# Patient Record
Sex: MALE | Race: WHITE | URBAN - METROPOLITAN AREA
[De-identification: names, ages, dates, MRNs, and addresses within clinical notes are randomized per-mention and may not be internally consistent; named-entity substitution may affect disease eponyms.]

---

## 2021-06-01 ENCOUNTER — RECORDS - HEALTHEAST (OUTPATIENT)
Dept: ADMINISTRATIVE | Facility: CLINIC | Age: 76
End: 2021-06-01

## 2025-05-14 RX ORDER — TRAMADOL HYDROCHLORIDE 50 MG/1
50 TABLET ORAL EVERY 12 HOURS PRN
Status: ON HOLD | COMMUNITY
Start: 2025-04-30 | End: 2025-06-05

## 2025-05-14 RX ORDER — MIRTAZAPINE 15 MG/1
15 TABLET, FILM COATED ORAL AT BEDTIME
Status: ON HOLD | COMMUNITY
Start: 2024-08-02

## 2025-05-14 RX ORDER — BUPROPION HYDROCHLORIDE 150 MG/1
150 TABLET ORAL EVERY MORNING
Status: ON HOLD | COMMUNITY
Start: 2024-12-05

## 2025-05-14 NOTE — PROVIDER NOTIFICATION
05/14/25 1238   Discharge Planning   Patient/Family Anticipates Transition to home with family   Concerns to be Addressed denies needs/concerns at this time   Living Arrangements   People in Home spouse   Type of Residence Private Residence   Is your private residence a single family home or apartment? Single family home   Number of Stairs, Within Home, Primary two   Stair Railings, Within Home, Primary railings safe and in good condition   Once home, are you able to live on one level? Yes   Which level?   (Ranch level home)   Which rooms are not on the main level? Other (Comments)  (Basement)   Bathroom Shower/Tub Tub/Shower unit   Equipment Currently Used at Home other (see comments)  (walking stick)   Support System   Support Systems Spouse/Significant Other   Do you have someone available to stay with you one or two nights once you are home? Yes  (Ryley, Wife)   Medical Clearance   Date of Physical 05/19/25   Clinic Name [facility] in [place]   It is recommended that you call and check with any specialty providers before surgery to see if you need surgical clearance.  Do you see any specialty providers outside of your primary care provider? No   Blood   Known Bleeding Disorder or Coagulopathy No  (on Eliquis)   What are your blood product preferences? patient accepts blood in an emergency   Falls Risk   Has the patient been identified as a high falls risk before surgery? (fallen in the last 6 months, history of falls, unsteady gait, or balance issues) Yes  (unsteady gait)   Did an order get placed to attend outpatient pre-surgery balance evaluation? No   Relationship/Environment   Name(s) of People in Home Melchor, Wife

## 2025-06-03 RX ORDER — ACETAMINOPHEN 500 MG
1000 TABLET ORAL EVERY 6 HOURS PRN
Status: ON HOLD | COMMUNITY

## 2025-06-03 RX ORDER — VENLAFAXINE 25 MG/1
25 TABLET ORAL 2 TIMES DAILY
Status: ON HOLD | COMMUNITY

## 2025-06-03 RX ORDER — VENLAFAXINE 25 MG/1
50 TABLET ORAL EVERY MORNING
Status: ON HOLD | COMMUNITY

## 2025-06-03 RX ORDER — CARBIDOPA AND LEVODOPA 50; 200 MG/1; MG/1
1 TABLET, EXTENDED RELEASE ORAL AT BEDTIME
Status: ON HOLD | COMMUNITY

## 2025-06-03 RX ORDER — LATANOPROST 50 UG/ML
1 SOLUTION/ DROPS OPHTHALMIC AT BEDTIME
Status: ON HOLD | COMMUNITY

## 2025-06-03 RX ORDER — DORZOLAMIDE HYDROCHLORIDE AND TIMOLOL MALEATE 20; 5 MG/ML; MG/ML
1 SOLUTION/ DROPS OPHTHALMIC AT BEDTIME
Status: ON HOLD | COMMUNITY

## 2025-06-03 NOTE — PHARMACY-ADMISSION MEDICATION HISTORY
Pharmacist Admission Medication History    Admission medication history is complete. The information provided in this note is only as accurate as the sources available at the time of the update.    Information Source(s): Patient and CareEverywhere/SureScripts via phone    Pertinent Information: initial med list was completed by presurgircal nurse. Writer called to clarify a few meds. Patient confirmed the Cosopt regimen below.    Changes made to PTA medication list:  Added: Sinemet CR, Tylenol, instruction for multiple meds  Deleted: clonazepam,   Changed: Sinemet dosing; venlafaxine 100 mg BID -> current regimens;     Allergies reviewed with patient and updates made in EHR: unable to assess    Medication History Completed By: Tate Leon RPH 6/3/2025 2:30 PM    PTA Med List   Medication Sig Note Last Dose/Taking    acetaminophen (TYLENOL) 500 MG tablet Take 1,000 mg by mouth every 6 hours as needed for mild pain.  Taking As Needed    apixaban ANTICOAGULANT (ELIQUIS) 2.5 MG tablet Take 2.5 mg by mouth 2 times daily.  Taking    buPROPion (WELLBUTRIN XL) 150 MG 24 hr tablet Take 150 mg by mouth every morning.  Taking    carbidopa-levodopa (SINEMET CR)  MG CR tablet Take 1 tablet by mouth at bedtime.  Taking    carbidopa-levodopa (SINEMET)  MG per tablet Take 2 tablets by mouth 3 times daily.  Taking    cyanocobalamin (VITAMIN B12) 1000 MCG/ML injection Inject 1 mL into the muscle every 30 days 6/3/2025: 1st day of every month Taking    dorzolamide-timolol (COSOPT) 2-0.5 % ophthalmic solution Place 1 drop Into the left eye at bedtime.  Taking    gabapentin (NEURONTIN) 300 MG capsule Take 600 mg by mouth at bedtime.  Taking    latanoprost (XALATAN) 0.005 % ophthalmic solution Place 1 drop into both eyes at bedtime.  Taking    mirtazapine (REMERON) 15 MG tablet Take 15 mg by mouth at bedtime.  Taking    Multiple Vitamin (MULTIVITAMIN PO) Take 1 tablet by mouth daily.  Taking    traMADol (ULTRAM) 50 MG  tablet Take 50 mg by mouth every 12 hours as needed.  Taking As Needed    venlafaxine (EFFEXOR) 25 MG tablet Take 50 mg by mouth every morning.  Taking    venlafaxine (EFFEXOR) 25 MG tablet Take 25 mg by mouth 2 times daily. Noon and bedtime.  Taking

## 2025-06-04 ENCOUNTER — HOSPITAL ENCOUNTER (INPATIENT)
Facility: CLINIC | Age: 80
End: 2025-06-04
Attending: ORTHOPAEDIC SURGERY | Admitting: ORTHOPAEDIC SURGERY
Payer: MEDICARE

## 2025-06-04 ENCOUNTER — APPOINTMENT (OUTPATIENT)
Dept: GENERAL RADIOLOGY | Facility: CLINIC | Age: 80
DRG: 470 | End: 2025-06-04
Attending: ORTHOPAEDIC SURGERY
Payer: MEDICARE

## 2025-06-04 ENCOUNTER — APPOINTMENT (OUTPATIENT)
Dept: PHYSICAL THERAPY | Facility: CLINIC | Age: 80
DRG: 470 | End: 2025-06-04
Attending: ORTHOPAEDIC SURGERY
Payer: MEDICARE

## 2025-06-04 ENCOUNTER — ANESTHESIA (OUTPATIENT)
Dept: SURGERY | Facility: CLINIC | Age: 80
End: 2025-06-04
Payer: MEDICARE

## 2025-06-04 ENCOUNTER — ANESTHESIA EVENT (OUTPATIENT)
Dept: SURGERY | Facility: CLINIC | Age: 80
End: 2025-06-04
Payer: MEDICARE

## 2025-06-04 DIAGNOSIS — Z96.642 STATUS POST TOTAL HIP REPLACEMENT, LEFT: Primary | ICD-10-CM

## 2025-06-04 PROCEDURE — 250N000011 HC RX IP 250 OP 636: Performed by: PHYSICIAN ASSISTANT

## 2025-06-04 PROCEDURE — C1776 JOINT DEVICE (IMPLANTABLE): HCPCS | Performed by: ORTHOPAEDIC SURGERY

## 2025-06-04 PROCEDURE — 258N000001 HC RX 258: Performed by: ORTHOPAEDIC SURGERY

## 2025-06-04 PROCEDURE — 97161 PT EVAL LOW COMPLEX 20 MIN: CPT | Mod: GP | Performed by: PHYSICAL THERAPIST

## 2025-06-04 PROCEDURE — 250N000011 HC RX IP 250 OP 636: Mod: JZ | Performed by: NURSE ANESTHETIST, CERTIFIED REGISTERED

## 2025-06-04 PROCEDURE — 250N000009 HC RX 250: Performed by: NURSE ANESTHETIST, CERTIFIED REGISTERED

## 2025-06-04 PROCEDURE — 97116 GAIT TRAINING THERAPY: CPT | Mod: GP | Performed by: PHYSICAL THERAPIST

## 2025-06-04 PROCEDURE — 250N000011 HC RX IP 250 OP 636: Mod: JZ | Performed by: ANESTHESIOLOGY

## 2025-06-04 PROCEDURE — 999N000141 HC STATISTIC PRE-PROCEDURE NURSING ASSESSMENT: Performed by: ORTHOPAEDIC SURGERY

## 2025-06-04 PROCEDURE — 250N000009 HC RX 250: Performed by: ORTHOPAEDIC SURGERY

## 2025-06-04 PROCEDURE — 258N000003 HC RX IP 258 OP 636: Performed by: NURSE ANESTHETIST, CERTIFIED REGISTERED

## 2025-06-04 PROCEDURE — 250N000025 HC SEVOFLURANE, PER MIN: Performed by: ORTHOPAEDIC SURGERY

## 2025-06-04 PROCEDURE — C1713 ANCHOR/SCREW BN/BN,TIS/BN: HCPCS | Performed by: ORTHOPAEDIC SURGERY

## 2025-06-04 PROCEDURE — 710N000009 HC RECOVERY PHASE 1, LEVEL 1, PER MIN: Performed by: ORTHOPAEDIC SURGERY

## 2025-06-04 PROCEDURE — 370N000017 HC ANESTHESIA TECHNICAL FEE, PER MIN: Performed by: ORTHOPAEDIC SURGERY

## 2025-06-04 PROCEDURE — 250N000011 HC RX IP 250 OP 636: Mod: JZ | Performed by: ORTHOPAEDIC SURGERY

## 2025-06-04 PROCEDURE — 250N000013 HC RX MED GY IP 250 OP 250 PS 637: Performed by: STUDENT IN AN ORGANIZED HEALTH CARE EDUCATION/TRAINING PROGRAM

## 2025-06-04 PROCEDURE — 250N000013 HC RX MED GY IP 250 OP 250 PS 637: Performed by: PHYSICIAN ASSISTANT

## 2025-06-04 PROCEDURE — 0LQK0ZZ REPAIR LEFT HIP TENDON, OPEN APPROACH: ICD-10-PCS | Performed by: ORTHOPAEDIC SURGERY

## 2025-06-04 PROCEDURE — 360N000077 HC SURGERY LEVEL 4, PER MIN: Performed by: ORTHOPAEDIC SURGERY

## 2025-06-04 PROCEDURE — 272N000001 HC OR GENERAL SUPPLY STERILE: Performed by: ORTHOPAEDIC SURGERY

## 2025-06-04 PROCEDURE — 120N000001 HC R&B MED SURG/OB

## 2025-06-04 PROCEDURE — 999N000063 XR PELVIS PORT 1/2 VIEWS

## 2025-06-04 PROCEDURE — 97530 THERAPEUTIC ACTIVITIES: CPT | Mod: GP | Performed by: PHYSICAL THERAPIST

## 2025-06-04 PROCEDURE — 258N000003 HC RX IP 258 OP 636: Performed by: ORTHOPAEDIC SURGERY

## 2025-06-04 PROCEDURE — 250N000013 HC RX MED GY IP 250 OP 250 PS 637: Performed by: ANESTHESIOLOGY

## 2025-06-04 PROCEDURE — 0SRB04A REPLACEMENT OF LEFT HIP JOINT WITH CERAMIC ON POLYETHYLENE SYNTHETIC SUBSTITUTE, UNCEMENTED, OPEN APPROACH: ICD-10-PCS | Performed by: ORTHOPAEDIC SURGERY

## 2025-06-04 DEVICE — BIOLOX DELTA CERAMIC FEMORAL HEAD +1.5 36MM DIA 12/14 TAPER
Type: IMPLANTABLE DEVICE | Site: HIP | Status: FUNCTIONAL
Brand: BIOLOX DELTA

## 2025-06-04 DEVICE — PINNACLE HIP SOLUTIONS ALTRX POLYETHYLENE ACETABULAR LINER +4 NEUTRAL 36MM ID 58MM OD
Type: IMPLANTABLE DEVICE | Site: HIP | Status: FUNCTIONAL
Brand: PINNACLE ALTRX

## 2025-06-04 DEVICE — PINNACLE POROCOAT ACETABULAR SHELL SECTOR II 58MM OD
Type: IMPLANTABLE DEVICE | Site: HIP | Status: FUNCTIONAL
Brand: PINNACLE POROCOAT

## 2025-06-04 DEVICE — PINNACLE CANCELLOUS BONE SCREW 6.5MM X 25MM
Type: IMPLANTABLE DEVICE | Site: HIP | Status: FUNCTIONAL
Brand: PINNACLE

## 2025-06-04 DEVICE — ACTIS DUOFIX HIP PROSTHESIS (FEMORAL STEM 12/14 TAPER CEMENTLESS SIZE 8 STD COLLAR)  CE
Type: IMPLANTABLE DEVICE | Site: HIP | Status: FUNCTIONAL
Brand: ACTIS

## 2025-06-04 DEVICE — IMPLANTABLE DEVICE: Type: IMPLANTABLE DEVICE | Site: HIP | Status: FUNCTIONAL

## 2025-06-04 RX ORDER — SODIUM CHLORIDE, SODIUM LACTATE, POTASSIUM CHLORIDE, CALCIUM CHLORIDE 600; 310; 30; 20 MG/100ML; MG/100ML; MG/100ML; MG/100ML
INJECTION, SOLUTION INTRAVENOUS CONTINUOUS
Status: DISCONTINUED | OUTPATIENT
Start: 2025-06-04 | End: 2025-06-04 | Stop reason: HOSPADM

## 2025-06-04 RX ORDER — SODIUM CHLORIDE, SODIUM LACTATE, POTASSIUM CHLORIDE, CALCIUM CHLORIDE 600; 310; 30; 20 MG/100ML; MG/100ML; MG/100ML; MG/100ML
INJECTION, SOLUTION INTRAVENOUS CONTINUOUS PRN
Status: DISCONTINUED | OUTPATIENT
Start: 2025-06-04 | End: 2025-06-04

## 2025-06-04 RX ORDER — VENLAFAXINE 25 MG/1
25 TABLET ORAL 2 TIMES DAILY
Status: DISPENSED | OUTPATIENT
Start: 2025-06-04

## 2025-06-04 RX ORDER — TRANEXAMIC ACID 650 MG/1
1950 TABLET ORAL ONCE
Status: COMPLETED | OUTPATIENT
Start: 2025-06-04 | End: 2025-06-04

## 2025-06-04 RX ORDER — NALOXONE HYDROCHLORIDE 0.4 MG/ML
0.4 INJECTION, SOLUTION INTRAMUSCULAR; INTRAVENOUS; SUBCUTANEOUS
Status: ACTIVE | OUTPATIENT
Start: 2025-06-04

## 2025-06-04 RX ORDER — KETOROLAC TROMETHAMINE 15 MG/ML
15 INJECTION, SOLUTION INTRAMUSCULAR; INTRAVENOUS
Status: DISCONTINUED | OUTPATIENT
Start: 2025-06-04 | End: 2025-06-04 | Stop reason: HOSPADM

## 2025-06-04 RX ORDER — LIDOCAINE 40 MG/G
CREAM TOPICAL
Status: DISCONTINUED | OUTPATIENT
Start: 2025-06-04 | End: 2025-06-04 | Stop reason: HOSPADM

## 2025-06-04 RX ORDER — FENTANYL CITRATE 50 UG/ML
25 INJECTION, SOLUTION INTRAMUSCULAR; INTRAVENOUS EVERY 5 MIN PRN
Status: DISCONTINUED | OUTPATIENT
Start: 2025-06-04 | End: 2025-06-04 | Stop reason: HOSPADM

## 2025-06-04 RX ORDER — MIRTAZAPINE 15 MG/1
15 TABLET, FILM COATED ORAL AT BEDTIME
Status: DISPENSED | OUTPATIENT
Start: 2025-06-04

## 2025-06-04 RX ORDER — NALOXONE HYDROCHLORIDE 0.4 MG/ML
0.1 INJECTION, SOLUTION INTRAMUSCULAR; INTRAVENOUS; SUBCUTANEOUS
Status: DISCONTINUED | OUTPATIENT
Start: 2025-06-04 | End: 2025-06-04 | Stop reason: HOSPADM

## 2025-06-04 RX ORDER — CYANOCOBALAMIN 1000 UG/ML
1000 INJECTION, SOLUTION INTRAMUSCULAR; SUBCUTANEOUS
Status: ACTIVE | OUTPATIENT
Start: 2025-06-25

## 2025-06-04 RX ORDER — METOPROLOL TARTRATE 1 MG/ML
1-2 INJECTION, SOLUTION INTRAVENOUS EVERY 5 MIN PRN
Status: DISCONTINUED | OUTPATIENT
Start: 2025-06-04 | End: 2025-06-04 | Stop reason: HOSPADM

## 2025-06-04 RX ORDER — ONDANSETRON 2 MG/ML
4 INJECTION INTRAMUSCULAR; INTRAVENOUS EVERY 30 MIN PRN
Status: DISCONTINUED | OUTPATIENT
Start: 2025-06-04 | End: 2025-06-04 | Stop reason: HOSPADM

## 2025-06-04 RX ORDER — HYDROXYZINE HYDROCHLORIDE 10 MG/1
10 TABLET, FILM COATED ORAL EVERY 6 HOURS PRN
Status: DISPENSED | OUTPATIENT
Start: 2025-06-04

## 2025-06-04 RX ORDER — DORZOLAMIDE HYDROCHLORIDE AND TIMOLOL MALEATE 20; 5 MG/ML; MG/ML
1 SOLUTION/ DROPS OPHTHALMIC AT BEDTIME
Status: DISPENSED | OUTPATIENT
Start: 2025-06-04

## 2025-06-04 RX ORDER — DEXAMETHASONE SODIUM PHOSPHATE 4 MG/ML
INJECTION, SOLUTION INTRA-ARTICULAR; INTRALESIONAL; INTRAMUSCULAR; INTRAVENOUS; SOFT TISSUE PRN
Status: DISCONTINUED | OUTPATIENT
Start: 2025-06-04 | End: 2025-06-04

## 2025-06-04 RX ORDER — DEXAMETHASONE SODIUM PHOSPHATE 4 MG/ML
4 INJECTION, SOLUTION INTRA-ARTICULAR; INTRALESIONAL; INTRAMUSCULAR; INTRAVENOUS; SOFT TISSUE
Status: DISCONTINUED | OUTPATIENT
Start: 2025-06-04 | End: 2025-06-04 | Stop reason: HOSPADM

## 2025-06-04 RX ORDER — CEFAZOLIN SODIUM 1 G/3ML
1 INJECTION, POWDER, FOR SOLUTION INTRAMUSCULAR; INTRAVENOUS EVERY 8 HOURS
Status: COMPLETED | OUTPATIENT
Start: 2025-06-04 | End: 2025-06-04

## 2025-06-04 RX ORDER — HYDROMORPHONE HCL IN WATER/PF 6 MG/30 ML
0.2 PATIENT CONTROLLED ANALGESIA SYRINGE INTRAVENOUS EVERY 5 MIN PRN
Status: DISCONTINUED | OUTPATIENT
Start: 2025-06-04 | End: 2025-06-04 | Stop reason: HOSPADM

## 2025-06-04 RX ORDER — EPHEDRINE SULFATE 50 MG/ML
INJECTION, SOLUTION INTRAMUSCULAR; INTRAVENOUS; SUBCUTANEOUS PRN
Status: DISCONTINUED | OUTPATIENT
Start: 2025-06-04 | End: 2025-06-04

## 2025-06-04 RX ORDER — CARBIDOPA AND LEVODOPA 50; 200 MG/1; MG/1
1 TABLET, EXTENDED RELEASE ORAL AT BEDTIME
Status: DISPENSED | OUTPATIENT
Start: 2025-06-05

## 2025-06-04 RX ORDER — LATANOPROST 50 UG/ML
1 SOLUTION/ DROPS OPHTHALMIC AT BEDTIME
Status: DISPENSED | OUTPATIENT
Start: 2025-06-04

## 2025-06-04 RX ORDER — HYDROMORPHONE HCL IN WATER/PF 6 MG/30 ML
0.4 PATIENT CONTROLLED ANALGESIA SYRINGE INTRAVENOUS EVERY 5 MIN PRN
Status: DISCONTINUED | OUTPATIENT
Start: 2025-06-04 | End: 2025-06-04 | Stop reason: HOSPADM

## 2025-06-04 RX ORDER — ACETAMINOPHEN 325 MG/1
975 TABLET ORAL ONCE
Status: COMPLETED | OUTPATIENT
Start: 2025-06-04 | End: 2025-06-04

## 2025-06-04 RX ORDER — ONDANSETRON 2 MG/ML
INJECTION INTRAMUSCULAR; INTRAVENOUS PRN
Status: DISCONTINUED | OUTPATIENT
Start: 2025-06-04 | End: 2025-06-04

## 2025-06-04 RX ORDER — FENTANYL CITRATE 50 UG/ML
50 INJECTION, SOLUTION INTRAMUSCULAR; INTRAVENOUS EVERY 5 MIN PRN
Status: DISCONTINUED | OUTPATIENT
Start: 2025-06-04 | End: 2025-06-04 | Stop reason: HOSPADM

## 2025-06-04 RX ORDER — ACETAMINOPHEN 325 MG/1
975 TABLET ORAL EVERY 8 HOURS SCHEDULED
Status: DISPENSED | OUTPATIENT
Start: 2025-06-04

## 2025-06-04 RX ORDER — CEFAZOLIN SODIUM/WATER 2 G/20 ML
2 SYRINGE (ML) INTRAVENOUS SEE ADMIN INSTRUCTIONS
Status: DISCONTINUED | OUTPATIENT
Start: 2025-06-04 | End: 2025-06-04 | Stop reason: HOSPADM

## 2025-06-04 RX ORDER — LIDOCAINE 40 MG/G
CREAM TOPICAL
Status: ACTIVE | OUTPATIENT
Start: 2025-06-04

## 2025-06-04 RX ORDER — AMOXICILLIN 250 MG
1 CAPSULE ORAL 2 TIMES DAILY
Status: DISPENSED | OUTPATIENT
Start: 2025-06-04

## 2025-06-04 RX ORDER — POLYETHYLENE GLYCOL 3350 17 G/17G
17 POWDER, FOR SOLUTION ORAL DAILY
Status: ACTIVE | OUTPATIENT
Start: 2025-06-05

## 2025-06-04 RX ORDER — PROPOFOL 10 MG/ML
INJECTION, EMULSION INTRAVENOUS PRN
Status: DISCONTINUED | OUTPATIENT
Start: 2025-06-04 | End: 2025-06-04

## 2025-06-04 RX ORDER — HYDRALAZINE HYDROCHLORIDE 20 MG/ML
2.5-5 INJECTION INTRAMUSCULAR; INTRAVENOUS EVERY 10 MIN PRN
Status: DISCONTINUED | OUTPATIENT
Start: 2025-06-04 | End: 2025-06-04 | Stop reason: HOSPADM

## 2025-06-04 RX ORDER — ONDANSETRON 2 MG/ML
4 INJECTION INTRAMUSCULAR; INTRAVENOUS EVERY 6 HOURS PRN
Status: ACTIVE | OUTPATIENT
Start: 2025-06-04

## 2025-06-04 RX ORDER — VENLAFAXINE 25 MG/1
50 TABLET ORAL EVERY MORNING
Status: DISPENSED | OUTPATIENT
Start: 2025-06-05

## 2025-06-04 RX ORDER — ONDANSETRON 4 MG/1
4 TABLET, ORALLY DISINTEGRATING ORAL EVERY 30 MIN PRN
Status: DISCONTINUED | OUTPATIENT
Start: 2025-06-04 | End: 2025-06-04 | Stop reason: HOSPADM

## 2025-06-04 RX ORDER — FENTANYL CITRATE 50 UG/ML
INJECTION, SOLUTION INTRAMUSCULAR; INTRAVENOUS PRN
Status: DISCONTINUED | OUTPATIENT
Start: 2025-06-04 | End: 2025-06-04

## 2025-06-04 RX ORDER — GABAPENTIN 300 MG/1
600 CAPSULE ORAL AT BEDTIME
Status: DISPENSED | OUTPATIENT
Start: 2025-06-04

## 2025-06-04 RX ORDER — MULTIPLE VITAMINS W/ MINERALS TAB 9MG-400MCG
1 TAB ORAL DAILY
Status: DISPENSED | OUTPATIENT
Start: 2025-06-05

## 2025-06-04 RX ORDER — BUPROPION HYDROCHLORIDE 150 MG/1
150 TABLET ORAL EVERY MORNING
Status: DISPENSED | OUTPATIENT
Start: 2025-06-05

## 2025-06-04 RX ORDER — NALOXONE HYDROCHLORIDE 0.4 MG/ML
0.2 INJECTION, SOLUTION INTRAMUSCULAR; INTRAVENOUS; SUBCUTANEOUS
Status: ACTIVE | OUTPATIENT
Start: 2025-06-04

## 2025-06-04 RX ORDER — PROCHLORPERAZINE MALEATE 5 MG/1
5 TABLET ORAL EVERY 6 HOURS PRN
Status: ACTIVE | OUTPATIENT
Start: 2025-06-04

## 2025-06-04 RX ORDER — LIDOCAINE HYDROCHLORIDE 20 MG/ML
INJECTION, SOLUTION INFILTRATION; PERINEURAL PRN
Status: DISCONTINUED | OUTPATIENT
Start: 2025-06-04 | End: 2025-06-04

## 2025-06-04 RX ORDER — GLYCOPYRROLATE 0.2 MG/ML
INJECTION, SOLUTION INTRAMUSCULAR; INTRAVENOUS PRN
Status: DISCONTINUED | OUTPATIENT
Start: 2025-06-04 | End: 2025-06-04

## 2025-06-04 RX ORDER — OXYCODONE HYDROCHLORIDE 5 MG/1
5 TABLET ORAL EVERY 4 HOURS PRN
Status: DISPENSED | OUTPATIENT
Start: 2025-06-04

## 2025-06-04 RX ORDER — SODIUM CHLORIDE, SODIUM LACTATE, POTASSIUM CHLORIDE, CALCIUM CHLORIDE 600; 310; 30; 20 MG/100ML; MG/100ML; MG/100ML; MG/100ML
INJECTION, SOLUTION INTRAVENOUS CONTINUOUS
Status: ACTIVE | OUTPATIENT
Start: 2025-06-04

## 2025-06-04 RX ORDER — HYDROMORPHONE HCL IN WATER/PF 6 MG/30 ML
0.1 PATIENT CONTROLLED ANALGESIA SYRINGE INTRAVENOUS EVERY 4 HOURS PRN
Status: ACTIVE | OUTPATIENT
Start: 2025-06-04

## 2025-06-04 RX ORDER — BISACODYL 10 MG
10 SUPPOSITORY, RECTAL RECTAL DAILY PRN
Status: ACTIVE | OUTPATIENT
Start: 2025-06-07

## 2025-06-04 RX ORDER — VANCOMYCIN HYDROCHLORIDE 1 G/20ML
INJECTION, POWDER, LYOPHILIZED, FOR SOLUTION INTRAVENOUS PRN
Status: DISCONTINUED | OUTPATIENT
Start: 2025-06-04 | End: 2025-06-04 | Stop reason: HOSPADM

## 2025-06-04 RX ORDER — HYDROMORPHONE HCL IN WATER/PF 6 MG/30 ML
0.2 PATIENT CONTROLLED ANALGESIA SYRINGE INTRAVENOUS EVERY 4 HOURS PRN
Status: ACTIVE | OUTPATIENT
Start: 2025-06-04

## 2025-06-04 RX ORDER — CARBIDOPA AND LEVODOPA 25; 100 MG/1; MG/1
2 TABLET ORAL 3 TIMES DAILY
Status: DISPENSED | OUTPATIENT
Start: 2025-06-04

## 2025-06-04 RX ORDER — ONDANSETRON 4 MG/1
4 TABLET, ORALLY DISINTEGRATING ORAL EVERY 6 HOURS PRN
Status: ACTIVE | OUTPATIENT
Start: 2025-06-04

## 2025-06-04 RX ORDER — CEFAZOLIN SODIUM/WATER 2 G/20 ML
2 SYRINGE (ML) INTRAVENOUS
Status: COMPLETED | OUTPATIENT
Start: 2025-06-04 | End: 2025-06-04

## 2025-06-04 RX ADMIN — FENTANYL CITRATE 50 MCG: 50 INJECTION, SOLUTION INTRAMUSCULAR; INTRAVENOUS at 10:29

## 2025-06-04 RX ADMIN — HYDROXYZINE HYDROCHLORIDE 10 MG: 10 TABLET, FILM COATED ORAL at 11:15

## 2025-06-04 RX ADMIN — PHENYLEPHRINE HYDROCHLORIDE 100 MCG: 10 INJECTION INTRAVENOUS at 08:20

## 2025-06-04 RX ADMIN — HYDROMORPHONE HYDROCHLORIDE 0.4 MG: 0.2 INJECTION, SOLUTION INTRAMUSCULAR; INTRAVENOUS; SUBCUTANEOUS at 10:38

## 2025-06-04 RX ADMIN — LATANOPROST 1 DROP: 50 SOLUTION OPHTHALMIC at 21:51

## 2025-06-04 RX ADMIN — LIDOCAINE HYDROCHLORIDE 50 MG: 20 INJECTION, SOLUTION INFILTRATION; PERINEURAL at 07:45

## 2025-06-04 RX ADMIN — TRANEXAMIC ACID 1950 MG: 650 TABLET ORAL at 05:58

## 2025-06-04 RX ADMIN — FENTANYL CITRATE 50 MCG: 50 INJECTION, SOLUTION INTRAMUSCULAR; INTRAVENOUS at 10:22

## 2025-06-04 RX ADMIN — OXYCODONE HYDROCHLORIDE 5 MG: 5 TABLET ORAL at 11:26

## 2025-06-04 RX ADMIN — PHENYLEPHRINE HYDROCHLORIDE 0.3 MCG/KG/MIN: 10 INJECTION INTRAVENOUS at 08:49

## 2025-06-04 RX ADMIN — HYDROMORPHONE HYDROCHLORIDE 0.5 MG: 1 INJECTION, SOLUTION INTRAMUSCULAR; INTRAVENOUS; SUBCUTANEOUS at 08:08

## 2025-06-04 RX ADMIN — GABAPENTIN 600 MG: 300 CAPSULE ORAL at 21:30

## 2025-06-04 RX ADMIN — FENTANYL CITRATE 100 MCG: 50 INJECTION INTRAMUSCULAR; INTRAVENOUS at 07:45

## 2025-06-04 RX ADMIN — ROCURONIUM BROMIDE 50 MG: 50 INJECTION, SOLUTION INTRAVENOUS at 07:45

## 2025-06-04 RX ADMIN — HYDROMORPHONE HYDROCHLORIDE 0.5 MG: 1 INJECTION, SOLUTION INTRAMUSCULAR; INTRAVENOUS; SUBCUTANEOUS at 08:31

## 2025-06-04 RX ADMIN — ACETAMINOPHEN 975 MG: 325 TABLET, FILM COATED ORAL at 11:04

## 2025-06-04 RX ADMIN — SODIUM CHLORIDE, SODIUM LACTATE, POTASSIUM CHLORIDE, AND CALCIUM CHLORIDE: .6; .31; .03; .02 INJECTION, SOLUTION INTRAVENOUS at 07:33

## 2025-06-04 RX ADMIN — OXYCODONE HYDROCHLORIDE 5 MG: 5 TABLET ORAL at 21:30

## 2025-06-04 RX ADMIN — DEXAMETHASONE SODIUM PHOSPHATE 4 MG: 4 INJECTION, SOLUTION INTRA-ARTICULAR; INTRALESIONAL; INTRAMUSCULAR; INTRAVENOUS; SOFT TISSUE at 07:56

## 2025-06-04 RX ADMIN — PHENYLEPHRINE HYDROCHLORIDE 100 MCG: 10 INJECTION INTRAVENOUS at 08:12

## 2025-06-04 RX ADMIN — CEFAZOLIN 1 G: 1 INJECTION, POWDER, FOR SOLUTION INTRAMUSCULAR; INTRAVENOUS at 16:04

## 2025-06-04 RX ADMIN — PHENYLEPHRINE HYDROCHLORIDE 100 MCG: 10 INJECTION INTRAVENOUS at 08:00

## 2025-06-04 RX ADMIN — Medication 2 G: at 07:33

## 2025-06-04 RX ADMIN — HYDROMORPHONE HYDROCHLORIDE 0.4 MG: 0.2 INJECTION, SOLUTION INTRAMUSCULAR; INTRAVENOUS; SUBCUTANEOUS at 10:49

## 2025-06-04 RX ADMIN — PHENYLEPHRINE HYDROCHLORIDE 100 MCG: 10 INJECTION INTRAVENOUS at 09:38

## 2025-06-04 RX ADMIN — VENLAFAXINE 25 MG: 25 TABLET ORAL at 19:54

## 2025-06-04 RX ADMIN — CARBIDOPA AND LEVODOPA 2 TABLET: 25; 100 TABLET ORAL at 19:54

## 2025-06-04 RX ADMIN — SENNOSIDES AND DOCUSATE SODIUM 1 TABLET: 50; 8.6 TABLET ORAL at 19:54

## 2025-06-04 RX ADMIN — HYDROXYZINE HYDROCHLORIDE 10 MG: 10 TABLET, FILM COATED ORAL at 17:23

## 2025-06-04 RX ADMIN — HYDROMORPHONE HYDROCHLORIDE 0.2 MG: 0.2 INJECTION, SOLUTION INTRAMUSCULAR; INTRAVENOUS; SUBCUTANEOUS at 11:25

## 2025-06-04 RX ADMIN — PHENYLEPHRINE HYDROCHLORIDE 50 MCG: 10 INJECTION INTRAVENOUS at 07:56

## 2025-06-04 RX ADMIN — PHENYLEPHRINE HYDROCHLORIDE 100 MCG: 10 INJECTION INTRAVENOUS at 08:37

## 2025-06-04 RX ADMIN — PHENYLEPHRINE HYDROCHLORIDE 100 MCG: 10 INJECTION INTRAVENOUS at 08:50

## 2025-06-04 RX ADMIN — CEFAZOLIN 1 G: 1 INJECTION, POWDER, FOR SOLUTION INTRAMUSCULAR; INTRAVENOUS at 23:28

## 2025-06-04 RX ADMIN — PHENYLEPHRINE HYDROCHLORIDE 50 MCG: 10 INJECTION INTRAVENOUS at 08:59

## 2025-06-04 RX ADMIN — GLYCOPYRROLATE 0.1 MG: 0.2 INJECTION, SOLUTION INTRAMUSCULAR; INTRAVENOUS at 07:56

## 2025-06-04 RX ADMIN — PROPOFOL 150 MG: 10 INJECTION, EMULSION INTRAVENOUS at 07:45

## 2025-06-04 RX ADMIN — SUGAMMADEX 200 MG: 100 INJECTION, SOLUTION INTRAVENOUS at 10:01

## 2025-06-04 RX ADMIN — EPHEDRINE SULFATE 5 MG: 5 INJECTION INTRAVENOUS at 08:03

## 2025-06-04 RX ADMIN — SODIUM CHLORIDE, SODIUM LACTATE, POTASSIUM CHLORIDE, AND CALCIUM CHLORIDE: .6; .31; .03; .02 INJECTION, SOLUTION INTRAVENOUS at 09:05

## 2025-06-04 RX ADMIN — MIRTAZAPINE 15 MG: 15 TABLET, FILM COATED ORAL at 21:30

## 2025-06-04 RX ADMIN — ACETAMINOPHEN 975 MG: 325 TABLET, FILM COATED ORAL at 19:54

## 2025-06-04 RX ADMIN — DORZOLAMIDE HYDROCHLORIDE AND TIMOLOL MALEATE 1 DROP: 20; 5 SOLUTION OPHTHALMIC at 21:30

## 2025-06-04 RX ADMIN — GLYCOPYRROLATE 0.1 MG: 0.2 INJECTION, SOLUTION INTRAMUSCULAR; INTRAVENOUS at 08:02

## 2025-06-04 RX ADMIN — CARBIDOPA AND LEVODOPA 2 TABLET: 25; 100 TABLET ORAL at 14:12

## 2025-06-04 RX ADMIN — ONDANSETRON 4 MG: 2 INJECTION INTRAMUSCULAR; INTRAVENOUS at 09:26

## 2025-06-04 RX ADMIN — OXYCODONE HYDROCHLORIDE 5 MG: 5 TABLET ORAL at 17:23

## 2025-06-04 RX ADMIN — PHENYLEPHRINE HYDROCHLORIDE 100 MCG: 10 INJECTION INTRAVENOUS at 08:43

## 2025-06-04 RX ADMIN — PHENYLEPHRINE HYDROCHLORIDE 50 MCG: 10 INJECTION INTRAVENOUS at 08:16

## 2025-06-04 ASSESSMENT — ACTIVITIES OF DAILY LIVING (ADL)
DIFFICULTY_COMMUNICATING: NO
ADLS_ACUITY_SCORE: 18
VISION_MANAGEMENT: GLASSES WITH PATIENT
ADLS_ACUITY_SCORE: 18
ADLS_ACUITY_SCORE: 22
DIFFICULTY_EATING/SWALLOWING: NO
ADLS_ACUITY_SCORE: 19
TOILETING_ISSUES: NO
ADLS_ACUITY_SCORE: 18
DRESSING/BATHING_DIFFICULTY: NO
ADLS_ACUITY_SCORE: 18
CHANGE_IN_FUNCTIONAL_STATUS_SINCE_ONSET_OF_CURRENT_ILLNESS/INJURY: YES
ADLS_ACUITY_SCORE: 26
ADLS_ACUITY_SCORE: 22
ADLS_ACUITY_SCORE: 18
FALL_HISTORY_WITHIN_LAST_SIX_MONTHS: YES
ADLS_ACUITY_SCORE: 18
ADLS_ACUITY_SCORE: 18
WEAR_GLASSES_OR_BLIND: YES
WALKING_OR_CLIMBING_STAIRS_DIFFICULTY: YES
ADLS_ACUITY_SCORE: 18
CONCENTRATING,_REMEMBERING_OR_MAKING_DECISIONS_DIFFICULTY: NO
NUMBER_OF_TIMES_PATIENT_HAS_FALLEN_WITHIN_LAST_SIX_MONTHS: 1
ADLS_ACUITY_SCORE: 18
WALKING_OR_CLIMBING_STAIRS: AMBULATION DIFFICULTY, REQUIRES EQUIPMENT
ADLS_ACUITY_SCORE: 26
DOING_ERRANDS_INDEPENDENTLY_DIFFICULTY: NO
ADLS_ACUITY_SCORE: 26
ADLS_ACUITY_SCORE: 26
HEARING_DIFFICULTY_OR_DEAF: NO

## 2025-06-04 NOTE — OP NOTE
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 Item Serial No.  Lot No. LRB No. Used Action   IMP CUP ACE PINNACLE 58MM 1217-22058 - [id number] Total Joint Component/Insert IMP CUP ACE PINNACLE 58MM 1217-22058  &[facility]-  Left 1 Implanted   APEX HOLE ELIMINATOR PS    Depuy [id number] Left 1 Implanted   6.5mm x 20mm screw PINNACLE CANELLOUS BONE SCREW    Depuy [id number] Left 1 Implanted   IMP SCR BONE CAN ACE 6.5X25MM 1217- - [id number] Metallic Hardware/[place] IMP SCR BONE CAN ACE 6.5X25MM 1217-25500  & [facility]-  Left 1 Implanted   IMP INSERT HIP DEPUY PINNACLE ALTRX 36MM +4 1221- - [id number] Total Joint Component/Insert IMP INSERT HIP DEPUY PINNACLE ALTRX 36MM +4 1221-  [employer] [facility] CARE [facility]- [id number] Left 1 Implanted   IMP STEM FEM DEPUY ACTIS STD COLLAR TPR SZ 8MM 1010- - [id number] Total Joint Component/Insert IMP STEM FEM DEPUY ACTIS STD COLLAR TPR SZ 8MM 1010-  J&[facility]ARE [facility]-  Left 1 Implanted   IMP HEAD FEMORAL DEPUY CERAMIC 36MM +1.5MM 1365- - [id number] Total Joint Component/Insert IMP HEAD FEMORAL DEPUY CERAMIC 36MM +1.5MM 1365-  J&[facility] CARE [facility]-  Left 1 Implanted       Indication for Procedure:   The patient is a 80 year old male with severe arthritis of the hip.  I discussed the treatment options with him and explained the risks benefits and potential complication of the surgery he want to proceed.  Discussion of the risks included but was not specific to infection, neurologic complication, DVT, septic and aseptic  loosening or fibrosis, fracture, leg length inequality, and instability of the hip.  After this discussion he wanted proceed with surgery.      Procedure Summary Narrative:  Bari was taken to the operating room and placed on the operating table in the supine position.  After adequate induction of a spinal anesthetic he was transferred to the lateral position and held this way with the Powell hip positioner. An axillary roll was placed, and care was taken to pad all bony prominences. He was given 2 g of Ancef for infection prophylaxis 1 hour prior to incision. We then performed a sterile prep and drape of the left hip and lower extremity.  After sterile prep and drape an incision was made and proceeded with the anterior approach to the hip.  We identified the tensor fascia divided in line with its fibers.  I then took down the anterior one third of the abductors and tagged and retracted medially for lateral repair and then did a T capsulotomy preserve the capsule for later repair.  We then were able to dislocate the hip dislocation and made appropriate femoral neck cut and applied bone wax to the cut surface of the femur diminished blood loss.    We placed retractors anteriorly, superiorly, and posteriorly to expose the acetabulum reamed to 57 mm eventually put in a 58 mm cup prior to placement a couple took reamings of the femoral head and uses bone graft once the cup was fully seated 2 screws were applied for additional fixation we then applied a manhole cover followed by +4 neutral liner for 58 mm cup and 36 mm head we then directed our attention to the femur used a broach only technique broached to a size 8 standard offset Actis stem and reduced the hip from the very stable full extension X rotation flexion adduction internal with restoration of leg length and offset.  We then obtained an intraoperative AP pelvis x-ray which showed ideal position of the cup and screws with a good fit on the femoral side with  restoration of leg length and offset.  We then dislocated the hip remove the trial components irrigated using the pulsatile jet lavage.  We then put on the actual stem once it was fully seated we applied the head and reduced the hip again and found the hip to be stable with restoration of leg length and offset.      We then soaked the hip in a dilute solution of Betadine for 3 minutes and irrigated using the pulsatile device.  Used approximately 3 L normal saline and the posterior fascia.  We then repaired the capsule using 0 Ethibond sutures.  Abductors repaired using #2 Ethibond through bone tunnels  The fascial layer was closed using 0 Ethibond was oversewn using 0 stratafix.  Deep subcutaneous layer was closed using 0 Vicryl suture, subcutaneous was closed with 2-0 Vicryl, skin was closed with a running 3-0 Monocryl subcuticular stitch followed by Prineo dressing.  Sterile dressing was then applied followed by abduction.  He tolerated the procedure there were no intraoperative complications.  Patient was sent to the [facility] in stable condition.      Plan will be for the patient get 24 hours of Ancef for infection prophylaxis and 30 days of aspirin for DVT prophylaxis.      Should be noted that my assistant was vital throughout the entire procedure for the safe transfer of the patient from the hospital bed to the operating table and back to the hospital bed is also used for retraction and closure of the wounds.     Fatimah Sharp MD  [facility]

## 2025-06-04 NOTE — ANESTHESIA PROCEDURE NOTES
Airway       Patient location during procedure: OR       Procedure Start/Stop Times: 6/4/2025 7:45 AM  Staff -        CRNA: Livia Ritter APRN CRNA       Performed By: CRNA  Consent for Airway        Urgency: elective  Indications and Patient Condition       Indications for airway management: shaji-procedural       Induction type:intravenous       Mask difficulty assessment: 1 - vent by mask    Final Airway Details       Final airway type: endotracheal airway       Successful airway: ETT - single  Endotracheal Airway Details        ETT size (mm): 8.0       Cuffed: yes       Successful intubation technique: direct laryngoscopy       DL Blade Type: MAC 4       Grade View of Cords: 1       Adjucts: stylet       Position: Right       Bite block used: None    Post intubation assessment        Placement verified by: capnometry        Number of attempts at approach: 1       Secured with: tape       Ease of procedure: easy       Dentition: Intact and Unchanged (Edentulous)    Medication(s) Administered   Medication Administration Time: 6/4/2025 7:45 AM

## 2025-06-04 NOTE — ANESTHESIA CARE TRANSFER NOTE
Patient: Bari Ribeiro    Procedure: Procedure(s):  Left total hip arthroplasty  with abductor tendon repair       Diagnosis: Osteoarthritis of left hip [M16.12]  Diagnosis Additional Information: No value filed.    Anesthesia Type:   General     Note:    Oropharynx: oropharynx clear of all foreign objects and spontaneously breathing  Level of Consciousness: drowsy  Oxygen Supplementation: face mask    Independent Airway: airway patency satisfactory and stable  Dentition: dentition unchanged  Vital Signs Stable: post-procedure vital signs reviewed and stable  Report to RN Given: handoff report given  Patient transferred to: PACU    Handoff Report: Identifed the Patient, Identified the Reponsible Provider, Reviewed the pertinent medical history, Discussed the surgical course, Reviewed Intra-OP anesthesia mangement and issues during anesthesia, Set expectations for post-procedure period and Allowed opportunity for questions and acknowledgement of understanding      Vitals:  Vitals Value Taken Time   /83 06/04/25 10:14   Temp 97.16  F (36.2  C) 06/04/25 10:17   Pulse 90 06/04/25 10:17   Resp 12 06/04/25 10:17   SpO2 100 % 06/04/25 10:17   Vitals shown include unfiled device data.    Electronically Signed By: ETHAN Min CRNA  June 4, 2025  10:19 AM

## 2025-06-04 NOTE — ANESTHESIA PREPROCEDURE EVALUATION
Anesthesia Pre-Procedure Evaluation    Patient: Bari Ribeiro   MRN: [id number] : 1945          Procedure : Procedure(s):  Left total hip arthroplasty  with abductor tendon repair         Past Medical History:   Diagnosis Date    Anemia     CIDP (chronic inflammatory demyelinating polyneuropathy) (H)     Other unspecified back disorder     Parkinson's disease     Pulmonary embolism (H)     Sleep apnea     USES CPAP    Unspecified essential hypertension       Past Surgical History:   Procedure Laterality Date    CHOLECYSTECTOMY      CRYOABLATION PROSTATE  2011    Procedure:CRYOABLATION PROSTATE; FLEXIBLE CYSTO WITH RIGHT NERVE SPARING CRYOABLATION OF PROSTATE    ; Surgeon:ATILIO KIRK; Location: OR    EXTRACORPOREAL SHOCK WAVE LITHOTRIPSY, CYSTOSCOPY, INSERT STENT URETER(S), COMBINED Right 3/2/2015    Procedure: COMBINED EXTRACORPOREAL SHOCK WAVE LITHOTRIPSY, CYSTOSCOPY, INSERT STENT URETER(S);  Surgeon: Atilio Kirk MD;  Location:  OR    GI SURGERY       GASTRECTOMY-DUE TO HIATAL HERNIA    ORTHOPEDIC SURGERY      elbow - bilateral    ZZC NONSPECIFIC PROCEDURE      cervical fusion    ZZC NONSPECIFIC PROCEDURE      Hiatal hernia    ZZC NONSPECIFIC PROCEDURE      (L) CTS release    ZZC NONSPECIFIC PROCEDURE      (L) dupuytren's release      Allergies   Allergen Reactions    Droperidol      Other Reaction(s): Extrapyramidal Symptoms    Akathesia, ? Serotonin syndrome or NMS    Duloxetine Other (See Comments)     Mood Swings    Codeine Nausea    Flagyl [Metronidazole] Itching    Niacin      flushing    Terazosin Rash      Social History     Tobacco Use    Smoking status: Former     Current packs/day: 0.00     Average packs/day: 1 pack/day for 25.0 years (25.0 ttl pk-yrs)     Types: Cigarettes     Start date: 1979     Quit date: 2004     Years since quittin.1    Smokeless tobacco: Not on file   Substance Use Topics    Alcohol use: Yes     Comment: 4 drinks/week      Wt Readings from  Last 1 Encounters:   06/04/25 76.4 kg (168 lb 6.4 oz)        Anesthesia Evaluation        History of anesthetic complications       ROS/MED HX  ENT/Pulmonary: Comment: multiple pulmonary emboli over the years, but now on Eliquis and no recurrence in past 8 years    (+) sleep apnea, mild, uses CPAP,                                      Neurologic:     (+)                 Parkinson's disease,               Cardiovascular:     (+)  hypertension- -   -  - -                                      METS/Exercise Tolerance: 4 - Raking leaves, gardening    Hematologic: Comments: No lab results found.   No lab results found.        Musculoskeletal:       GI/Hepatic: Comment: past hiatal hernia, torn esophagus, esophageal repair and some dysmotility since then in 2011      Renal/Genitourinary:       Endo:       Psychiatric/Substance Use:     (+) psychiatric history depression       Infectious Disease:       Malignancy:   (+) Malignancy, History of Prostate.Prostate CA Remission status post Surgery.      Other:              Physical Exam  Airway  Mallampati: II  TM distance: >3 FB  Neck ROM: full  Mouth opening: >= 4 cm    Cardiovascular - normal exam   Dental   (+) Minor Abnormalities - some fillings, tiny chips      Pulmonary - normal exam      Neurological - normal exam  He appears awake, alert and oriented x3.    Other Findings       OUTSIDE LABS:  CBC:   Lab Results   Component Value Date    WBC 7.3 09/18/2015    WBC 6.7 02/03/2006    HGB 13.7 09/18/2015    HGB 15.3 02/03/2006    HCT 40.0 09/18/2015    HCT 44.8 02/03/2006     09/18/2015     02/03/2006     BMP:   Lab Results   Component Value Date     09/18/2015     02/03/2006    POTASSIUM 3.8 09/18/2015    POTASSIUM 4.5 02/03/2006    CHLORIDE 108 09/18/2015    CHLORIDE 102 02/03/2006    CO2 27 09/18/2015    CO2 24 02/03/2006    BUN 19 09/18/2015    BUN 13 02/03/2006    CR 1.09 09/18/2015    CR 1.00 02/03/2006     (H) 09/18/2015      "02/03/2006     COAGS:   Lab Results   Component Value Date    PTT 29 02/03/2006    INR 2.39 (H) 09/18/2015     POC: No results found for: \"BGM\", \"HCG\", \"HCGS\"  HEPATIC:   Lab Results   Component Value Date    ALBUMIN 3.4 09/18/2015    PROTTOTAL 7.0 09/18/2015    ALT 71 (H) 09/18/2015     (H) 09/18/2015    ALKPHOS 129 09/18/2015    BILITOTAL 0.8 09/18/2015     OTHER:   Lab Results   Component Value Date    KENNEY 8.1 (L) 09/18/2015    PHOS 2.8 12/26/2002    LIPASE 23,071 (H) 09/18/2015       Anesthesia Plan    ASA Status:  3      NPO Status: NPO Appropriate   Anesthesia Type: General.  Induction: intravenous.  Maintenance: Balanced.   Techniques and Equipment:       - Monitoring Plan: standard ASA monitoring     Consents    Anesthesia Plan(s) and associated risks, benefits, and realistic alternatives discussed. Questions answered and patient/representative(s) expressed understanding.     - Discussed: anesthesiologist     - Discussed with:  Patient        - Pt is DNR/DNI Status: no DNR     Blood Consent:      - Discussed with: patient.     Postoperative Care    Pain management: plan for postoperative opioid use.     Comments:                   Gary Lacy MD    I have reviewed the pertinent notes and labs in the chart from the past 30 days and (re)examined the patient.  Any updates or changes from those notes are reflected in this note.    Clinically Significant Risk Factors Present on Admission                # Drug Induced Coagulation Defect: home medication list includes an anticoagulant medication    # Hypertension: Noted on problem list                            "

## 2025-06-04 NOTE — ANESTHESIA POSTPROCEDURE EVALUATION
Patient: Bari Ribeiro    Procedure: Procedure(s):  Left total hip arthroplasty  with abductor tendon repair       Anesthesia Type:  General    Note:  Disposition: Outpatient   Postop Pain Control: Uneventful            Sign Out: Well controlled pain   PONV: No   Neuro/Psych: Uneventful            Sign Out: Acceptable/Baseline neuro status   Airway/Respiratory: Uneventful            Sign Out: Acceptable/Baseline resp. status   CV/Hemodynamics: Uneventful            Sign Out: Acceptable CV status; No obvious hypovolemia; No obvious fluid overload   Other NRE: NONE   DID A NON-ROUTINE EVENT OCCUR? No           Last vitals:  Vitals Value Taken Time   /78 06/04/25 13:00   Temp 97.52  F (36.4  C) 06/04/25 11:56   Pulse 69 06/04/25 13:00   Resp 12 06/04/25 12:00   SpO2 98 % 06/04/25 13:08   Vitals shown include unfiled device data.    Electronically Signed By: Gary Lacy MD  June 4, 2025  1:09 PM

## 2025-06-04 NOTE — BRIEF OP NOTE
[facility]    Brief Operative Note    Pre-operative diagnosis: Osteoarthritis of left hip [M16.12]  Post-operative diagnosis Same as pre-operative diagnosis    Procedure: Left total hip arthroplasty, Left - Hip  with abductor tendon repair, Left - Hip    Surgeon: Surgeons and Role:     * Fatimah Sharp MD - Primary     * Marco A Cosme PA-C - Assisting  Anesthesia: General   Estimated Blood Loss: 200 ml    Drains: None  Specimens: * No specimens in log *  Findings:   None.  Complications: None.  Implants:   Implant Name Type Inv. Item Serial No.  Lot No. LRB No. Used Action   IMP CUP ACE PINNACLE 58MM 1217-22058 - [id number] Total Joint Component/Insert IMP CUP ACE PINNACLE 58MM 1217-22058  &J [facility]-  Left 1 Implanted   APEX HOLE ELIMINATOR PS    Depuy [id number] Left 1 Implanted   6.5mm x 20mm screw PINNACLE CANELLOUS BONE SCREW    Depuy [id number] Left 1 Implanted   IMP SCR BONE CAN ACE 6.5X25MM 1217- - [id number] Metallic Hardware/[place] IMP SCR BONE CAN ACE 6.5X25MM 1217-  &J [facility]-  Left 1 Implanted   IMP INSERT HIP DEPUY PINNACLE ALTRX 36MM +4 1221- - [id number] Total Joint Component/Insert IMP INSERT HIP DEPUY PINNACLE ALTRX 36MM +4 1221-  J&J [facility]- [id number] Left 1 Implanted   IMP STEM FEM DEPUY ACTIS STD COLLAR TPR SZ 8MM 1010- - [id number] Total Joint Component/Insert IMP STEM FEM DEPUY ACTIS STD COLLAR TPR SZ 8MM 1010-  [facility]-  Left 1 Implanted   IMP HEAD FEMORAL DEPUY CERAMIC 36MM +1.5MM 1365- - [id number] Total Joint Component/Insert IMP HEAD FEMORAL DEPUY CERAMIC 36MM +1.5MM 1365-  J&J [facility]-  Left 1 Implanted

## 2025-06-05 ENCOUNTER — APPOINTMENT (OUTPATIENT)
Dept: PHYSICAL THERAPY | Facility: CLINIC | Age: 80
DRG: 470 | End: 2025-06-05
Attending: ORTHOPAEDIC SURGERY
Payer: MEDICARE

## 2025-06-05 ENCOUNTER — APPOINTMENT (OUTPATIENT)
Dept: OCCUPATIONAL THERAPY | Facility: CLINIC | Age: 80
DRG: 470 | End: 2025-06-05
Attending: ORTHOPAEDIC SURGERY
Payer: MEDICARE

## 2025-06-05 VITALS
BODY MASS INDEX: 21.61 KG/M2 | SYSTOLIC BLOOD PRESSURE: 123 MMHG | DIASTOLIC BLOOD PRESSURE: 63 MMHG | HEIGHT: 74 IN | WEIGHT: 168.4 LBS | HEART RATE: 71 BPM | TEMPERATURE: 97.8 F | RESPIRATION RATE: 14 BRPM | OXYGEN SATURATION: 96 %

## 2025-06-05 LAB
GLUCOSE SERPL-MCNC: 104 MG/DL (ref 70–99)
HGB BLD-MCNC: 11.3 G/DL (ref 13.3–17.7)
MCV RBC AUTO: 97 FL (ref 78–100)

## 2025-06-05 PROCEDURE — 250N000013 HC RX MED GY IP 250 OP 250 PS 637: Performed by: STUDENT IN AN ORGANIZED HEALTH CARE EDUCATION/TRAINING PROGRAM

## 2025-06-05 PROCEDURE — 97535 SELF CARE MNGMENT TRAINING: CPT | Mod: GO

## 2025-06-05 PROCEDURE — 82947 ASSAY GLUCOSE BLOOD QUANT: CPT | Performed by: ORTHOPAEDIC SURGERY

## 2025-06-05 PROCEDURE — 120N000001 HC R&B MED SURG/OB

## 2025-06-05 PROCEDURE — 250N000013 HC RX MED GY IP 250 OP 250 PS 637: Performed by: PHYSICIAN ASSISTANT

## 2025-06-05 PROCEDURE — 97530 THERAPEUTIC ACTIVITIES: CPT | Mod: GP

## 2025-06-05 PROCEDURE — 97116 GAIT TRAINING THERAPY: CPT | Mod: GP

## 2025-06-05 PROCEDURE — 36415 COLL VENOUS BLD VENIPUNCTURE: CPT | Performed by: PHYSICIAN ASSISTANT

## 2025-06-05 PROCEDURE — 97165 OT EVAL LOW COMPLEX 30 MIN: CPT | Mod: GO

## 2025-06-05 PROCEDURE — 85018 HEMOGLOBIN: CPT | Performed by: PHYSICIAN ASSISTANT

## 2025-06-05 RX ORDER — HYDROXYZINE HYDROCHLORIDE 10 MG/1
10 TABLET, FILM COATED ORAL EVERY 6 HOURS PRN
Qty: 30 TABLET | Refills: 0 | Status: SHIPPED | OUTPATIENT
Start: 2025-06-05

## 2025-06-05 RX ORDER — AMOXICILLIN 250 MG
1-2 CAPSULE ORAL 2 TIMES DAILY
Qty: 30 TABLET | Refills: 0 | Status: SHIPPED | OUTPATIENT
Start: 2025-06-05

## 2025-06-05 RX ORDER — OXYCODONE HYDROCHLORIDE 5 MG/1
2.5-5 TABLET ORAL EVERY 4 HOURS PRN
Qty: 26 TABLET | Refills: 0 | Status: SHIPPED | OUTPATIENT
Start: 2025-06-05

## 2025-06-05 RX ORDER — TAMSULOSIN HYDROCHLORIDE 0.4 MG/1
0.4 CAPSULE ORAL DAILY
Status: DISPENSED | OUTPATIENT
Start: 2025-06-05

## 2025-06-05 RX ORDER — ACETAMINOPHEN 325 MG/1
650 TABLET ORAL EVERY 4 HOURS PRN
Qty: 100 TABLET | Refills: 0 | Status: SHIPPED | OUTPATIENT
Start: 2025-06-05

## 2025-06-05 RX ORDER — ONDANSETRON 4 MG/1
4 TABLET, ORALLY DISINTEGRATING ORAL EVERY 8 HOURS PRN
Qty: 10 TABLET | Refills: 0 | Status: SHIPPED | OUTPATIENT
Start: 2025-06-05

## 2025-06-05 RX ADMIN — SENNOSIDES AND DOCUSATE SODIUM 1 TABLET: 50; 8.6 TABLET ORAL at 08:49

## 2025-06-05 RX ADMIN — LATANOPROST 1 DROP: 50 SOLUTION OPHTHALMIC at 21:11

## 2025-06-05 RX ADMIN — HYDROXYZINE HYDROCHLORIDE 10 MG: 10 TABLET, FILM COATED ORAL at 17:10

## 2025-06-05 RX ADMIN — OXYCODONE HYDROCHLORIDE 2.5 MG: 5 TABLET ORAL at 18:52

## 2025-06-05 RX ADMIN — CARBIDOPA AND LEVODOPA 2 TABLET: 25; 100 TABLET ORAL at 08:48

## 2025-06-05 RX ADMIN — VENLAFAXINE 50 MG: 25 TABLET ORAL at 08:49

## 2025-06-05 RX ADMIN — OXYCODONE HYDROCHLORIDE 2.5 MG: 5 TABLET ORAL at 14:46

## 2025-06-05 RX ADMIN — BUPROPION HYDROCHLORIDE 150 MG: 150 TABLET, EXTENDED RELEASE ORAL at 08:49

## 2025-06-05 RX ADMIN — CARBIDOPA AND LEVODOPA 2 TABLET: 25; 100 TABLET ORAL at 13:50

## 2025-06-05 RX ADMIN — ACETAMINOPHEN 975 MG: 325 TABLET, FILM COATED ORAL at 19:51

## 2025-06-05 RX ADMIN — GABAPENTIN 600 MG: 300 CAPSULE ORAL at 21:12

## 2025-06-05 RX ADMIN — SENNOSIDES AND DOCUSATE SODIUM 1 TABLET: 50; 8.6 TABLET ORAL at 19:52

## 2025-06-05 RX ADMIN — APIXABAN 2.5 MG: 2.5 TABLET, FILM COATED ORAL at 19:51

## 2025-06-05 RX ADMIN — DORZOLAMIDE HYDROCHLORIDE AND TIMOLOL MALEATE 1 DROP: 20; 5 SOLUTION OPHTHALMIC at 21:11

## 2025-06-05 RX ADMIN — VENLAFAXINE 25 MG: 25 TABLET ORAL at 13:50

## 2025-06-05 RX ADMIN — TAMSULOSIN HYDROCHLORIDE 0.4 MG: 0.4 CAPSULE ORAL at 10:42

## 2025-06-05 RX ADMIN — CARBIDOPA AND LEVODOPA 1 TABLET: 50; 200 TABLET, EXTENDED RELEASE ORAL at 21:12

## 2025-06-05 RX ADMIN — VENLAFAXINE 25 MG: 25 TABLET ORAL at 19:54

## 2025-06-05 RX ADMIN — OXYCODONE HYDROCHLORIDE 5 MG: 5 TABLET ORAL at 08:49

## 2025-06-05 RX ADMIN — APIXABAN 2.5 MG: 2.5 TABLET, FILM COATED ORAL at 08:48

## 2025-06-05 RX ADMIN — HYDROXYZINE HYDROCHLORIDE 10 MG: 10 TABLET, FILM COATED ORAL at 08:48

## 2025-06-05 RX ADMIN — Medication 1 TABLET: at 08:48

## 2025-06-05 RX ADMIN — CARBIDOPA AND LEVODOPA 2 TABLET: 25; 100 TABLET ORAL at 19:51

## 2025-06-05 RX ADMIN — MIRTAZAPINE 15 MG: 15 TABLET, FILM COATED ORAL at 21:12

## 2025-06-05 RX ADMIN — OXYCODONE HYDROCHLORIDE 5 MG: 5 TABLET ORAL at 04:20

## 2025-06-05 RX ADMIN — ACETAMINOPHEN 975 MG: 325 TABLET, FILM COATED ORAL at 04:20

## 2025-06-05 RX ADMIN — ACETAMINOPHEN 975 MG: 325 TABLET, FILM COATED ORAL at 13:50

## 2025-06-05 ASSESSMENT — ACTIVITIES OF DAILY LIVING (ADL)
ADLS_ACUITY_SCORE: 30
ADLS_ACUITY_SCORE: 32
ADLS_ACUITY_SCORE: 26
ADLS_ACUITY_SCORE: 26
ADLS_ACUITY_SCORE: 30
ADLS_ACUITY_SCORE: 32
ADLS_ACUITY_SCORE: 32
ADLS_ACUITY_SCORE: 30
ADLS_ACUITY_SCORE: 32
ADLS_ACUITY_SCORE: 26
ADLS_ACUITY_SCORE: 32
ADLS_ACUITY_SCORE: 30
ADLS_ACUITY_SCORE: 27
ADLS_ACUITY_SCORE: 26
ADLS_ACUITY_SCORE: 32
ADLS_ACUITY_SCORE: 26
ADLS_ACUITY_SCORE: 32
ADLS_ACUITY_SCORE: 27
ADLS_ACUITY_SCORE: 26

## 2025-06-05 NOTE — PLAN OF CARE
"Goal Outcome Evaluation:      Plan of Care Reviewed With: patient    Overall Patient Progress: improvingOverall Patient Progress: improving         [id number]  Has ambulated with nursing staff overnight. A1 wgb. Pain controlled with prn oxy 5 at 2130, 0420 RA. Is having urinary retention. Did bladder scan up to 418 ml at 0008 Did void and had a post void residual of 309 ml at 0222 another PVR at 0544 of 429 ml. Agreed to straight cath at 0600 for 550 ml. Due to void   Probable discharge home today       Problem: Delirium  Goal: Optimal Coping  Outcome: Progressing  Goal: Improved Behavioral Control  Outcome: Progressing  Intervention: Minimize Safety Risk  Recent Flowsheet Documentation  Taken 6/4/2025 2105 by Reginald Johnson RN  Enhanced Safety Measures:   anti-tipping device on wheelchairs   pain management   review medications for side effects with activity  Goal: Improved Attention and Thought Clarity  Outcome: Progressing  Goal: Improved Sleep  Outcome: Progressing     Problem: Adult Inpatient Plan of Care  Goal: Plan of Care Review  Description: The Plan of Care Review/Shift note should be completed every shift.  The Outcome Evaluation is a brief statement about your assessment that the patient is improving, declining, or no change.  This information will be displayed automatically on your shift  note.  Outcome: Progressing  Flowsheets (Taken 6/5/2025 0335)  Plan of Care Reviewed With: patient  Overall Patient Progress: improving  Goal: Patient-Specific Goal (Individualized)  Description: You can add care plan individualizations to a care plan. Examples of Individualization might be:  \"Parent requests to be called daily at 9am for status\", \"I have a hard time hearing out of my right ear\", or \"Do not touch me to wake me up as it startles  me\".  Outcome: Progressing  Goal: Absence of Hospital-Acquired Illness or Injury  Outcome: Progressing  Intervention: Identify and Manage Fall Risk  Recent Flowsheet " Documentation  Taken 6/4/2025 2105 by Reginald Johnson, RN  Safety Promotion/Fall Prevention:   activity supervised   assistive device/personal items within reach   clutter free environment maintained   mobility aid in reach   patient and family education   nonskid shoes/slippers when out of bed   safety round/check completed  Intervention: Prevent and Manage VTE (Venous Thromboembolism) Risk  Recent Flowsheet Documentation  Taken 6/4/2025 2105 by Reginald Johnson, RN  VTE Prevention/Management: SCDs on (sequential compression devices)  Taken 6/4/2025 2002 by Reginald Johnson RN  VTE Prevention/Management: SCDs on (sequential compression devices)  Intervention: Prevent Infection  Recent Flowsheet Documentation  Taken 6/4/2025 2105 by Reginald Johnson, RN  Infection Prevention:   rest/sleep promoted   single patient room provided  Goal: Optimal Comfort and Wellbeing  Outcome: Progressing  Intervention: Monitor Pain and Promote Comfort  Recent Flowsheet Documentation  Taken 6/4/2025 2130 by Reginald Johnson, RN  Pain Management Interventions: medication (see MAR)  Goal: Readiness for Transition of Care  Outcome: Progressing

## 2025-06-05 NOTE — PROGRESS NOTES
06/05/25 0933   Appointment Info   Signing Clinician's Name / Credentials (OT) Jie Johnson OTR/L   Rehab Comments (OT) L WBAT, no active ABD L LE   Living Environment   People in Home spouse   Current Living Arrangements house  (single level, does not need to go into basement)   Number of Stairs, Within Home, Primary one  (2 to get to main level, 1 into the house)   Stair Railings, Within Home, Primary none   Transportation Anticipated family or friend will provide  (was driving prior to surgery)   Living Environment Comments was ind at baseline, lives with spouse. has both walk in shower and tub shower, planning to use walk in   Self-Care   Usual Activity Tolerance moderate   Current Activity Tolerance moderate   Equipment Currently Used at Home cane, straight;dressing device;grab bar, tub/shower;raised toilet seat;shower chair;walker, rolling  (reacher, sock aide)   Fall history within last six months yes   Number of times patient has fallen within last six months 1   Instrumental Activities of Daily Living (IADL)   IADL Comments was ind at baseline with IADLs besides cooking   General Information   Onset of Illness/Injury or Date of Surgery 06/04/25   Referring Physician Marco A Cosme PA-C   Patient/Family Therapy Goal Statement (OT) to go home   Additional Occupational Profile Info/Pertinent History of Current Problem The patient is a 80 year old male with severe arthritis of the hip.   Existing Precautions/Restrictions fall;no active hip ABD   Left Lower Extremity (Weight-bearing Status) weight-bearing as tolerated (WBAT)   Cognitive Status Examination   Orientation Status orientation to person, place and time   Follows Commands follows one-step commands   Visual Perception   Visual Impairment/Limitations corrective lenses full-time   Pain Assessment   Patient Currently in Pain Yes, see Vital Sign flowsheet  (6/10 upon arrival)   Posture   Posture forward head position   Range of Motion  Comprehensive   General Range of Motion bilateral upper extremity ROM WFL   Strength Comprehensive (MMT)   Comment, General Manual Muscle Testing (MMT) Assessment genera; weakness due to recent surgery   Coordination   Upper Extremity Coordination No deficits were identified   Bed Mobility   Comment (Bed Mobility) pt in recliner   Transfers   Transfers sit-stand transfer;toilet transfer   Sit-Stand Transfer   Sit-Stand [place] (Transfers) contact guard   Assistive Device (Sit-Stand Transfers) walker, front-wheeled   Toilet Transfer   [place] Level (Toilet Transfer) contact guard   Assistive Device (Toilet Transfer) grab bars/safety frame;walker, front-wheeled   Balance   Balance Comments some unsteadiness   Activities of Daily Living   BADL Assessment/Intervention lower body dressing;toileting   Lower Body Dressing Assessment/Training   [place] Level (Lower Body Dressing) doff;don;pants/bottoms;socks;verbal cues;minimum assist (75% patient effort)   Assistive Devices (Lower Body Dressing) reacher;sock-aid   Position (Lower Body Dressing) supported sitting   Toileting   [place] Level (Toileting) toileting skills;adjust/manage clothing;contact guard assist   Clinical Impression   Criteria for Skilled Therapeutic Interventions Met (OT) Yes, treatment indicated   OT Diagnosis L REILLY, impaired ADLs   OT Problem List-Impairments impacting ADL problems related to;activity tolerance impaired;balance;range of motion (ROM);pain;strength;post-surgical precautions   Assessment of Occupational Performance 3-5 Performance Deficits   Identified Performance Deficits dressing, bathing, driving, home managment   Planned Therapy Interventions (OT) ADL retraining;transfer training;progressive activity/exercise   Clinical Decision Making Complexity (OT) problem focused assessment/low complexity   Risk & Benefits of therapy have been explained evaluation/treatment results reviewed;care plan/treatment goals  reviewed;risks/benefits reviewed;current/potential barriers reviewed;participants voiced agreement with care plan;participants included;patient;spouse/significant other   OT Total Evaluation Time   OT Eval, Low Complexity Minutes (34175) 9   OT Goals   Therapy Frequency (OT) Daily   OT Predicted Duration/Target Date for Goal Attainment 06/12/25   OT Goals Lower Body Dressing;Toilet Transfer/Toileting   OT: Lower Body Dressing Modified independent   OT: Toilet Transfer/Toileting Modified independent   Self-Care/Home Management   Self-Care/Home Mgmt/ADL, Compensatory, Meal Prep Minutes (53352) 16   Symptoms Noted During/After Treatment (Meal Preparation/Planning Training) fatigue;increased pain   Treatment Detail/Skilled Intervention Pt in recliner upon arrival, agreed to OT eval. Spouse present. Pt educated on hip precaution. Pt educated on AE for LB dressing using reacher and sock aide. Pt completed LB dressing with min A, requiring some adjustment with reacher with pants. Pt then ambulated to the bathroom with FWW and CGA. Pt completed toilet transfer with CGA. While pt standing had a mild posterior sway but recovered with grabbing the grab bar to steady himself. Pt ambulated back to the recliner with FWW and CGA. Pt/spouse educated on walk in shower transfer, by going in backwards to maintain precautions. VCs to keep toes pointed forward and to not pivot while turning. Pt educated on walker safety, keeping both hands on the walker and removing scatter rugs. All needs within reach, alarm on.   OT Discharge Planning   OT Plan toilet transfer, standing g/h.   OT Discharge Recommendation (DC Rec) other (see comments)  (defer to ortho)   OT Rationale for DC Rec Defer to ortho- Pt below baseline due to recent REILLY and precautions. Ant discharge home with assistance from spouse for IADLs and ADLs as needed.   OT Brief overview of current status Ax1 FWW for functional transfers, cga/min A for self cares   OT Total Distance  Amb During Session (feet) 45   Total Session Time   Timed Code Treatment Minutes 16   Total Session Time (sum of timed and untimed services) 25

## 2025-06-05 NOTE — PROGRESS NOTES
Patient vital signs are at baseline: Yes  Patient able to ambulate as they were prior to admission or with assist devices provided by therapies during their stay:  Yes  Patient MUST void prior to discharge:  No,  Reason:  straight cath in PACU  Patient able to tolerate oral intake:  Yes  Pain has adequate pain control using Oral analgesics:  Yes  Does patient have an identified :  Yes  Has goal D/C date and time been discussed with patient:  Yes      Patient alert and oriented. Forgetful. VSS on room air. Pain managed with oxycodone and atarax. Paged to on call to get tylenol for pain. Has not voided. Straight cath in PACU before arrival to unit this evening. CMS at baseline. Plan to discharge home tomorrow.

## 2025-06-05 NOTE — PROGRESS NOTES
"Bari Montesh  2025  POD # 1 s/p LTHA  Admit Date:  2025      Doing well.  Clean wound without signs of infection.  Normal healing wound.  No excessive bleeding  Pain well-controlled.  Tolerating physical therapy and rehabilitation well.  Objective: no chest pain or shortness of breath. Ready to discharge home. Having some issue with urinating. He is taking flomax now and hopefull this helps.   Blood pressure 134/69, pulse 67, temperature 97.1  F (36.2  C), temperature source Temporal, resp. rate 16, height 1.888 m (6' 2.33\"), weight 76.4 kg (168 lb 6.4 oz), SpO2 94%.    Temperatures:  Current - Temp: 97.1  F (36.2  C); Max - Temp  Av.6  F (36.4  C)  Min: 97.1  F (36.2  C)  Max: 98.4  F (36.9  C)  Pulse range: Pulse  Av.4  Min: 60  Max: 77  Blood pressure range: Systolic (24hrs), Av , Min:115 , Max:145   ; Diastolic (24hrs), Av, Min:63, Max:83    Exam:  CMS: intact  alert, stable, wound ok  Dressing c/d/I  Calf s/nt  DF/PF   Communicates clearly with examiner    Labs:  No results for input(s): \"POTASSIUM\" in the last 95920 hours.  Recent Labs   Lab Test 25  0716   HGB 11.3*     No results for input(s): \"INR\" in the last 34490 hours.  No results for input(s): \"PLT\" in the last 59000 hours.    PLAN: WBAT in the LLE. Eliquis as he was on at home. Discharge for home was able to. Scripts e scribed. No hip abduction. Follow up in 2 weeks.     "

## 2025-06-05 NOTE — PROGRESS NOTES
06/04/25 1547   Appointment Info   Signing Clinician's Name / Credentials (PT) John Stiles DPT   Rehab Comments (PT) WBAT, hip act abd L LE   Living Environment   Living Environment Comments Pt lives in  home with spouse. 2 [place] with railing. All needs met on main level. Ind with mobility at baseline.   Self-Care   Usual Activity Tolerance moderate   Current Activity Tolerance moderate   Equipment Currently Used at Home cane, straight;dressing device;grab bar, tub/shower;raised toilet seat;shower chair;walker, rolling   Fall history within last six months yes   Number of times patient has fallen within last six months 1   General Information   Onset of Illness/Injury or Date of Surgery 06/04/25   Referring Physician Marco A Cosme PA-C   Patient/Family Therapy Goals Statement (PT) To improve mobility   Pertinent History of Current Problem (include personal factors and/or comorbidities that impact the POC) Pt is a 80 year old male POD0 s/p L REILLY with abductor repair.   Existing Precautions/Restrictions weight bearing;no active hip ABD   Weight-Bearing Status - LLE weight-bearing as tolerated   Cognition   Affect/Mental Status (Cognition) WFL   Orientation Status (Cognition) oriented x 4   Follows Commands (Cognition) WFL   Pain Assessment   Patient Currently in Pain Yes, see Vital Sign flowsheet  (L hip)   Range of Motion (ROM)   Range of Motion ROM deficits secondary to surgical procedure   ROM Comment decreased L hip flexion   Strength (Manual Muscle Testing)   Strength (Manual Muscle Testing) Deficits observed during functional mobility   Strength Comments able to complete B SLR   Bed Mobility   Comment, (Bed Mobility) [place] supine <> sit   Transfers   Comment, (Transfers) CGA sit <> Stand with FWW   Gait/Stairs (Locomotion)   Distance in Feet (Gait) 20   Pattern (Gait) step-through   Deviations/Abnormal Patterns (Gait) base of support, wide;antalgic;gait speed decreased;stride length  decreased;weight shifting decreased   Comment, (Gait/Stairs) CGA with FWW   Balance   Balance Comments good sitting; fair+ standing with FWW   Sensory Examination   Sensory Perception patient reports no sensory changes   Clinical Impression   Criteria for Skilled Therapeutic Intervention Yes, treatment indicated   PT Diagnosis (PT) impaired functional mobility   Influenced by the following impairments decreased L hip ROM, impaired L LE strength, decreased balance   Functional limitations due to impairments impaired transfers, bed mobility, ambulation, stairs   Clinical Presentation (PT Evaluation Complexity) stable   Clinical Presentation Rationale Pt is medically stable   Clinical Decision Making (Complexity) low complexity   Planned Therapy Interventions (PT) balance training;bed mobility training;cryotherapy;gait training;home exercise program;neuromuscular re-education;ROM (range of motion);stair training;strengthening;transfer training;patient/family education   Risk & Benefits of therapy have been explained evaluation/treatment results reviewed;care plan/treatment goals reviewed;risks/benefits reviewed;current/potential barriers reviewed;participants voiced agreement with care plan;participants included;patient   Physical Therapy Goals   PT Frequency Daily   PT Predicted Duration/Target Date for Goal Attainment 06/05/25   PT Goals Bed Mobility;Gait;Transfers;Stairs   PT: Bed Mobility Minimal assist;Supine to/from sit;Within precautions   PT: Transfers Supervision/stand-by assist;Sit to/from stand;Assistive device   PT: Gait Supervision/stand-by assist;Assistive device;100 feet   PT: Stairs Supervision/stand-by assist;2 stairs;Assistive device;Rail on left   PT Discharge Planning   PT Plan progress ind with bed mobility (reinforce no active hip abd), transfers; inc amb; trial stairs   PT Discharge Recommendation (DC Rec) other (see comments)   PT Rationale for DC Rec Defer to ortho- anticipate pt to safe  discharge home with support from spouse, use of FWW.   PT Brief overview of current status Ax1 with FWW   PT Total Distance Amb During Session (feet) 190

## 2025-06-05 NOTE — PROVIDER NOTIFICATION
Page to Corey, ortho PA - pt has not been able to void. Straight cath required this morning. Start Flomax?

## 2025-06-05 NOTE — PLAN OF CARE
Goal Outcome Evaluation:      Plan of Care Reviewed With: patient, spouse    Overall Patient Progress: improvingOverall Patient Progress: improving    Outcome Evaluation: pt unable to void, flomax started, pain managed with PO meds, will discharge home with wife when ready.    Up Ax1 w/gb. Pain controlled with prn oxy 5, atarax and scheduled Tylenol. No void this shift, Flomax started. Bladder scan 105mLs @1430. Encouraged PO fluid intake. Due to void. CMS intact with baseline n/t in BLEs. Dressing CDI. Will discharge home when medically ready. Wife at bedside throughout the shift. Corey SAPP, advised that pt has not yet voided.  Possible discharge tomorrow with [facility] if pt is not able to void.       Problem: Hip Arthroplasty  Goal: Effective Urinary Elimination  6/5/2025 1426 by Kristin Pablo RN  Outcome: Not Progressing  6/5/2025 1425 by Kristin Pablo RN  Outcome: Not Progressing  6/5/2025 1423 by Kristin Pablo RN  Outcome: Progressing       Problem: Delirium  Goal: Optimal Coping  6/5/2025 1426 by Kristin Pablo RN  Outcome: Progressing  6/5/2025 1425 by Kristin Pablo RN  Outcome: Progressing  6/5/2025 1423 by Kristin Pablo RN  Outcome: Progressing  Goal: Improved Behavioral Control  6/5/2025 1426 by Kristin Pablo RN  Outcome: Progressing  6/5/2025 1425 by Kristin Pablo RN  Outcome: Progressing  6/5/2025 1423 by Kristin Pablo RN  Outcome: Progressing  Intervention: Minimize Safety Risk  Recent Flowsheet Documentation  Taken 6/5/2025 0847 by Kristin Pablo RN  Enhanced Safety Measures:   anti-tipping device on wheelchairs   pain management   review medications for side effects with activity  Goal: Improved Attention and Thought Clarity  6/5/2025 1426 by Kristin Pablo RN  Outcome: Progressing  6/5/2025 1425 by Kristin Pablo RN  Outcome: Progressing  6/5/2025 1423 by Kristin Pablo RN  Outcome: Progressing  Goal: Improved Sleep  6/5/2025 1426 by Kristin Pablo RN  Outcome:  "Progressing  6/5/2025 1425 by Kristin Pablo RN  Outcome: Progressing  6/5/2025 1423 by Kristin Pablo RN  Outcome: Progressing     Problem: Adult Inpatient Plan of Care  Goal: Plan of Care Review  Description: The Plan of Care Review/Shift note should be completed every shift.  The Outcome Evaluation is a brief statement about your assessment that the patient is improving, declining, or no change.  This information will be displayed automatically on your shift  note.  6/5/2025 1426 by Kristin Pablo RN  Outcome: Progressing  Flowsheets (Taken 6/5/2025 1426)  Plan of Care Reviewed With:   patient   spouse  Overall Patient Progress: improving  6/5/2025 1425 by Kristin Pablo RN  Outcome: Progressing  Flowsheets (Taken 6/5/2025 1425)  Plan of Care Reviewed With:   patient   spouse  Overall Patient Progress: improving  6/5/2025 1423 by Kristin Pablo RN  Outcome: Progressing  Flowsheets (Taken 6/5/2025 1423)  Outcome Evaluation: pt unable to void, flomax started, pain managed with PO meds, will discharge home with wife when ready.  Plan of Care Reviewed With:   patient   spouse  Goal: Patient-Specific Goal (Individualized)  Description: You can add care plan individualizations to a care plan. Examples of Individualization might be:  \"Parent requests to be called daily at 9am for status\", \"I have a hard time hearing out of my right ear\", or \"Do not touch me to wake me up as it startles  me\".  6/5/2025 1426 by Kristin Pablo RN  Outcome: Progressing  6/5/2025 1425 by Kristin Pablo RN  Outcome: Progressing  6/5/2025 1423 by Kristin Pablo RN  Outcome: Progressing  Goal: Absence of Hospital-Acquired Illness or Injury  6/5/2025 1426 by Kristin Pablo RN  Outcome: Progressing  6/5/2025 1425 by Kristin Pablo RN  Outcome: Progressing  6/5/2025 1423 by Kristin Pablo RN  Outcome: Progressing  Intervention: Identify and Manage Fall Risk  Recent Flowsheet Documentation  Taken 6/5/2025 0848 by Kristin Pablo" KIARA HARRIS  Safety Promotion/Fall Prevention:   activity supervised   assistive device/personal items within reach   clutter free environment maintained   mobility aid in reach   patient and family education   nonskid shoes/slippers when out of bed   safety round/check completed  Intervention: Prevent Skin Injury  Recent Flowsheet Documentation  Taken 6/5/2025 1048 by Kristin Pablo RN  Body Position: supine, head elevated  Taken 6/5/2025 0848 by Kristin Pablo RN  Skin Protection:   adhesive use limited   pulse oximeter probe site changed  Intervention: Prevent and Manage VTE (Venous Thromboembolism) Risk  Recent Flowsheet Documentation  Taken 6/5/2025 1048 by Kristin Pablo RN  VTE Prevention/Management: SCDs on (sequential compression devices)  Taken 6/5/2025 0848 by Kristin Pablo RN  VTE Prevention/Management: SCDs off (sequential compression devices)  Intervention: Prevent Infection  Recent Flowsheet Documentation  Taken 6/5/2025 0848 by Kristin Pablo RN  Infection Prevention:   rest/sleep promoted   single patient room provided  Goal: Optimal Comfort and Wellbeing  6/5/2025 1426 by Kristin Pablo RN  Outcome: Progressing  6/5/2025 1425 by Kristin Pablo RN  Outcome: Progressing  6/5/2025 1423 by Kristin Pablo RN  Outcome: Progressing  Intervention: Monitor Pain and Promote Comfort  Recent Flowsheet Documentation  Taken 6/5/2025 0848 by Kristin Pablo RN  Pain Management Interventions:   medication (see MAR)   cold applied  Goal: Readiness for Transition of Care  6/5/2025 1426 by Kristin Pablo RN  Outcome: Progressing  6/5/2025 1425 by Kristin Pablo RN  Outcome: Progressing  6/5/2025 1423 by Kristin Pablo RN  Outcome: Progressing     Problem: Hip Arthroplasty  Goal: Optimal Coping  6/5/2025 1426 by Kristin Pablo RN  Outcome: Progressing  6/5/2025 1425 by Kristin Pablo RN  Outcome: Progressing  6/5/2025 1423 by Kristin Pablo RN  Outcome: Progressing  Goal: Absence of  Bleeding  6/5/2025 1426 by Kristin Pablo RN  Outcome: Progressing  6/5/2025 1425 by Kristin Pablo RN  Outcome: Progressing  6/5/2025 1423 by Kristin Pablo RN  Outcome: Progressing  Goal: Effective Bowel Elimination  6/5/2025 1426 by Kristin Pablo RN  Outcome: Progressing  6/5/2025 1425 by Kristin Pablo RN  Outcome: Progressing  6/5/2025 1423 by Kristin Pablo RN  Outcome: Progressing  Goal: Fluid and Electrolyte Balance  6/5/2025 1426 by Kristin Pablo RN  Outcome: Progressing  6/5/2025 1425 by Kristin Pablo RN  Outcome: Progressing  6/5/2025 1423 by Kristin Pablo RN  Outcome: Progressing  Goal: Optimal Functional Ability  6/5/2025 1426 by Kristin Pablo RN  Outcome: Progressing  6/5/2025 1425 by Kristin Pablo RN  Outcome: Progressing  6/5/2025 1423 by Kristin Pablo RN  Outcome: Progressing  Intervention: Promote Optimal Functional Status  Recent Flowsheet Documentation  Taken 6/5/2025 1048 by Kristin Pablo RN  Assistive Device Utilized:   walker   gait belt  Activity Management:   activity encouraged   back to bed  Taken 6/5/2025 0848 by Kristin Pablo RN  Assistive Device Utilized:   walker   gait belt  Activity Management:   ambulated outside room   up in chair  Goal: Absence of Infection Signs and Symptoms  6/5/2025 1426 by Kristin Pablo RN  Outcome: Progressing  6/5/2025 1425 by Kristin Pablo RN  Outcome: Progressing  6/5/2025 1423 by Kristin Pablo RN  Outcome: Progressing  Goal: Intact Neurovascular Status  6/5/2025 1426 by Kristin Pablo RN  Outcome: Progressing  6/5/2025 1425 by Kristin Pablo RN  Outcome: Progressing  6/5/2025 1423 by Kristin Pablo RN  Outcome: Progressing  Goal: Anesthesia/Sedation Recovery  6/5/2025 1426 by Kristin Pablo RN  Outcome: Progressing  6/5/2025 1425 by Kristin Pablo RN  Outcome: Progressing  6/5/2025 1423 by Samy, Kristin J, RN  Outcome: Progressing  Intervention: Optimize Anesthesia Recovery  Recent Flowsheet  Documentation  Taken 6/5/2025 0848 by Kristin Pablo RN  Safety Promotion/Fall Prevention:   activity supervised   assistive device/personal items within reach   clutter free environment maintained   mobility aid in reach   patient and family education   nonskid shoes/slippers when out of bed   safety round/check completed  Administration (IS):   instruction provided, initial   proper technique demonstrated   self-administered  Goal: Optimal Pain Control and Function  6/5/2025 1426 by Kristin Pablo RN  Outcome: Progressing  6/5/2025 1425 by Kristin Pablo RN  Outcome: Progressing  6/5/2025 1423 by Kristin Pablo RN  Outcome: Progressing  Intervention: Prevent or Manage Pain  Recent Flowsheet Documentation  Taken 6/5/2025 0848 by Kristin Pablo RN  Pain Management Interventions:   medication (see MAR)   cold applied  Goal: Nausea and Vomiting Relief  6/5/2025 1426 by Kristin Pablo RN  Outcome: Progressing  6/5/2025 1425 by Kristin Pablo RN  Outcome: Progressing  6/5/2025 1423 by Kristin Pablo RN  Outcome: Progressing  Goal: Effective Oxygenation and Ventilation  6/5/2025 1426 by Kristin Pablo RN  Outcome: Progressing  6/5/2025 1425 by Kristin Pablo RN  Outcome: Progressing  6/5/2025 1423 by Kristin Pablo RN  Outcome: Progressing  Intervention: Optimize Oxygenation and Ventilation  Recent Flowsheet Documentation  Taken 6/5/2025 1048 by Kristin Pablo RN  Activity Management:   activity encouraged   back to bed  Head of Bed (HOB) Positioning: HOB at 15 degrees  Taken 6/5/2025 0848 by Kristin Pablo RN  Cough And Deep Breathing: done independently per patient  Activity Management:   ambulated outside room   up in chair

## 2025-06-06 ENCOUNTER — APPOINTMENT (OUTPATIENT)
Dept: PHYSICAL THERAPY | Facility: CLINIC | Age: 80
DRG: 470 | End: 2025-06-06
Attending: ORTHOPAEDIC SURGERY
Payer: MEDICARE

## 2025-06-06 ENCOUNTER — APPOINTMENT (OUTPATIENT)
Dept: OCCUPATIONAL THERAPY | Facility: CLINIC | Age: 80
DRG: 470 | End: 2025-06-06
Attending: ORTHOPAEDIC SURGERY
Payer: MEDICARE

## 2025-06-06 VITALS
WEIGHT: 168.4 LBS | HEIGHT: 74 IN | SYSTOLIC BLOOD PRESSURE: 118 MMHG | OXYGEN SATURATION: 94 % | BODY MASS INDEX: 21.61 KG/M2 | DIASTOLIC BLOOD PRESSURE: 64 MMHG | RESPIRATION RATE: 16 BRPM | HEART RATE: 85 BPM | TEMPERATURE: 98.4 F

## 2025-06-06 LAB
GLUCOSE SERPL-MCNC: 108 MG/DL (ref 70–99)
HGB BLD-MCNC: 11.3 G/DL (ref 13.3–17.7)
MCV RBC AUTO: 96 FL (ref 78–100)

## 2025-06-06 PROCEDURE — 97116 GAIT TRAINING THERAPY: CPT | Mod: GP | Performed by: PHYSICAL THERAPIST

## 2025-06-06 PROCEDURE — 97535 SELF CARE MNGMENT TRAINING: CPT | Mod: GO

## 2025-06-06 PROCEDURE — 250N000013 HC RX MED GY IP 250 OP 250 PS 637: Performed by: STUDENT IN AN ORGANIZED HEALTH CARE EDUCATION/TRAINING PROGRAM

## 2025-06-06 PROCEDURE — 250N000013 HC RX MED GY IP 250 OP 250 PS 637: Performed by: PHYSICIAN ASSISTANT

## 2025-06-06 PROCEDURE — 85018 HEMOGLOBIN: CPT | Performed by: PHYSICIAN ASSISTANT

## 2025-06-06 PROCEDURE — 97530 THERAPEUTIC ACTIVITIES: CPT | Mod: GP | Performed by: PHYSICAL THERAPIST

## 2025-06-06 PROCEDURE — 36415 COLL VENOUS BLD VENIPUNCTURE: CPT | Performed by: INTERNAL MEDICINE

## 2025-06-06 PROCEDURE — 82947 ASSAY GLUCOSE BLOOD QUANT: CPT | Performed by: INTERNAL MEDICINE

## 2025-06-06 RX ADMIN — ACETAMINOPHEN 975 MG: 325 TABLET, FILM COATED ORAL at 11:42

## 2025-06-06 RX ADMIN — ACETAMINOPHEN 975 MG: 325 TABLET, FILM COATED ORAL at 03:13

## 2025-06-06 RX ADMIN — VENLAFAXINE 25 MG: 25 TABLET ORAL at 11:42

## 2025-06-06 RX ADMIN — SENNOSIDES AND DOCUSATE SODIUM 1 TABLET: 50; 8.6 TABLET ORAL at 08:48

## 2025-06-06 RX ADMIN — APIXABAN 2.5 MG: 2.5 TABLET, FILM COATED ORAL at 08:49

## 2025-06-06 RX ADMIN — TAMSULOSIN HYDROCHLORIDE 0.4 MG: 0.4 CAPSULE ORAL at 08:49

## 2025-06-06 RX ADMIN — VENLAFAXINE 50 MG: 25 TABLET ORAL at 08:48

## 2025-06-06 RX ADMIN — POLYETHYLENE GLYCOL 3350 17 G: 17 POWDER, FOR SOLUTION ORAL at 08:49

## 2025-06-06 RX ADMIN — Medication 1 TABLET: at 08:48

## 2025-06-06 RX ADMIN — CARBIDOPA AND LEVODOPA 2 TABLET: 25; 100 TABLET ORAL at 08:48

## 2025-06-06 RX ADMIN — BUPROPION HYDROCHLORIDE 150 MG: 150 TABLET, EXTENDED RELEASE ORAL at 08:49

## 2025-06-06 ASSESSMENT — ACTIVITIES OF DAILY LIVING (ADL)
ADLS_ACUITY_SCORE: 32
ADLS_ACUITY_SCORE: 30
ADLS_ACUITY_SCORE: 32
ADLS_ACUITY_SCORE: 30
ADLS_ACUITY_SCORE: 32

## 2025-06-06 NOTE — PROGRESS NOTES
Physical Therapy Discharge Summary    Reason for therapy discharge:    All goals and outcomes met, no further needs identified.    Progress towards therapy goal(s). See goals on Care Plan in [facility] electronic health record for goal details.  Goals met    Therapy recommendation(s):    Continue home exercise program.

## 2025-06-06 NOTE — DISCHARGE INSTRUCTIONS
Urology care follow-up instructions  (Discharge to HOME)  PRIOR TO DISCHARGE        Comments: ~  Instruct patient to schedule a follow-up with a Urology Clinic in 5-7 days for catheter removal and trial voiding.  ~  If patient has an established urologist, they should follow-up with their Urology Provider.  ~  If patient does not have an established Urologist, they can schedule a follow-up visit with [facility] by calling [phone].

## 2025-06-06 NOTE — PLAN OF CARE
A&Ox4.  Pain managed with scheduled Tylenol.  Dressing left hip c/d/I.  CMS WNL.  Discharge instructions reviewed with patient and his wife including medications, schuler cares, follow up with urology.  Discharge meds with patient at time of discharge, all personal belongings in patients care.  PIV removed.  Discharged to home in c/o wife.  Problem: Delirium  Goal: Improved Behavioral Control  Intervention: Minimize Safety Risk  Recent Flowsheet Documentation  Taken 6/6/2025 0900 by Constance Johnson RN  Enhanced Safety Measures:   assistive devices when indicated   pain management   patient/family teach back on injury risk   room near unit station     Problem: Adult Inpatient Plan of Care  Goal: Absence of Hospital-Acquired Illness or Injury  Intervention: Identify and Manage Fall Risk  Recent Flowsheet Documentation  Taken 6/6/2025 0900 by Constance Johnson RN  Safety Promotion/Fall Prevention:   activity supervised   assistive device/personal items within reach   clutter free environment maintained   nonskid shoes/slippers when out of bed   patient and family education   room near nurse's station   safety round/check completed  Intervention: Prevent Skin Injury  Recent Flowsheet Documentation  Taken 6/6/2025 0900 by Constance Johnson RN  Body Position: position changed independently  Skin Protection:   adhesive use limited   tubing/devices free from skin contact  Intervention: Prevent and Manage VTE (Venous Thromboembolism) Risk  Recent Flowsheet Documentation  Taken 6/6/2025 0900 by Constance Johnson RN  VTE Prevention/Management: SCDs on (sequential compression devices)  Intervention: Prevent Infection  Recent Flowsheet Documentation  Taken 6/6/2025 0900 by Constance Johnson RN  Infection Prevention:   hand hygiene promoted   single patient room provided     Problem: Hip Arthroplasty  Goal: Absence of Bleeding  Intervention: Monitor and Manage Bleeding  Recent Flowsheet Documentation  Taken 6/6/2025  0900 by Constance Johnson RN  Bleeding Management: dressing monitored  Goal: Effective Bowel Elimination  Intervention: Enhance Bowel Motility and Elimination  Recent Flowsheet Documentation  Taken 6/6/2025 0900 by Constance Johnson RN  Bowel Motility Enhancement:   ambulation promoted   fluid intake encouraged  Goal: Optimal Functional Ability  Intervention: Promote Optimal Functional Status  Recent Flowsheet Documentation  Taken 6/6/2025 0900 by Constance Johnson RN  Self-Care Promotion: independence encouraged  Goal: Absence of Infection Signs and Symptoms  Intervention: Prevent or Manage Infection  Recent Flowsheet Documentation  Taken 6/6/2025 0900 by Constance Johnson RN  Infection Management: aseptic technique maintained  Goal: Anesthesia/Sedation Recovery  Intervention: Optimize Anesthesia Recovery  Recent Flowsheet Documentation  Taken 6/6/2025 0900 by Constance Johnson RN  Safety Promotion/Fall Prevention:   activity supervised   assistive device/personal items within reach   clutter free environment maintained   nonskid shoes/slippers when out of bed   patient and family education   room near nurse's station   safety round/check completed  Goal: Effective Oxygenation and Ventilation  Intervention: Optimize Oxygenation and Ventilation  Recent Flowsheet Documentation  Taken 6/6/2025 0900 by Constance Johnson RN  Cough And Deep Breathing: done independently per patient  Head of Bed (HOB) Positioning: HOB at 45 degrees   Goal Outcome Evaluation:

## 2025-06-06 NOTE — PLAN OF CARE
"Goal Outcome Evaluation:      Plan of Care Reviewed With: patient    Overall Patient Progress: improvingOverall Patient Progress: improving             [id number]  Voiding appears to have improved. Voided 200 ml at 0040 with a pvr of 300 ml. Additional PVR of 350 ml, schuler cath placed per order  at 0347 tolerated placement well.   Discharge home today with schuler cath      Problem: Delirium  Goal: Optimal Coping  Outcome: Progressing  Goal: Improved Behavioral Control  Outcome: Progressing  Intervention: Minimize Safety Risk  Recent Flowsheet Documentation  Taken 6/6/2025 0100 by Reginald Johnson RN  Enhanced Safety Measures: pain management  Goal: Improved Attention and Thought Clarity  Outcome: Progressing  Goal: Improved Sleep  Outcome: Progressing     Problem: Adult Inpatient Plan of Care  Goal: Plan of Care Review  Description: The Plan of Care Review/Shift note should be completed every shift.  The Outcome Evaluation is a brief statement about your assessment that the patient is improving, declining, or no change.  This information will be displayed automatically on your shift  note.  Outcome: Progressing  Flowsheets (Taken 6/6/2025 0248)  Plan of Care Reviewed With: patient  Overall Patient Progress: improving  Goal: Patient-Specific Goal (Individualized)  Description: You can add care plan individualizations to a care plan. Examples of Individualization might be:  \"Parent requests to be called daily at 9am for status\", \"I have a hard time hearing out of my right ear\", or \"Do not touch me to wake me up as it startles  me\".  Outcome: Progressing  Goal: Absence of Hospital-Acquired Illness or Injury  Outcome: Progressing  Intervention: Identify and Manage Fall Risk  Recent Flowsheet Documentation  Taken 6/6/2025 0100 by Reginald Johnson RN  Safety Promotion/Fall Prevention:   activity supervised   room near nurse's station   room organization consistent   safety round/check completed   mobility aid in reach   " lighting adjusted   increase visualization of patient  Intervention: Prevent Skin Injury  Recent Flowsheet Documentation  Taken 6/6/2025 0100 by Reginald Johnson RN  Body Position: position changed independently  Skin Protection:   adhesive use limited   pulse oximeter probe site changed  Intervention: Prevent and Manage VTE (Venous Thromboembolism) Risk  Recent Flowsheet Documentation  Taken 6/6/2025 0100 by Reginald Johnson RN  VTE Prevention/Management: SCDs on (sequential compression devices)  Intervention: Prevent Infection  Recent Flowsheet Documentation  Taken 6/6/2025 0100 by Reginald Johnson RN  Infection Prevention:   rest/sleep promoted   single patient room provided  Goal: Optimal Comfort and Wellbeing  Outcome: Progressing  Intervention: Monitor Pain and Promote Comfort  Recent Flowsheet Documentation  Taken 6/6/2025 0100 by Reginald Johnson RN  Pain Management Interventions: cold applied  Goal: Readiness for Transition of Care  Outcome: Progressing     Problem: Hip Arthroplasty  Goal: Optimal Coping  Outcome: Progressing  Goal: Absence of Bleeding  Outcome: Progressing  Intervention: Monitor and Manage Bleeding  Recent Flowsheet Documentation  Taken 6/6/2025 0100 by Reginald Johnson RN  Bleeding Management: dressing monitored  Goal: Effective Bowel Elimination  Outcome: Progressing  Goal: Fluid and Electrolyte Balance  Outcome: Progressing  Goal: Optimal Functional Ability  Outcome: Progressing  Goal: Absence of Infection Signs and Symptoms  Outcome: Progressing  Goal: Intact Neurovascular Status  Outcome: Progressing  Goal: Anesthesia/Sedation Recovery  Outcome: Progressing  Intervention: Optimize Anesthesia Recovery  Recent Flowsheet Documentation  Taken 6/6/2025 0100 by Reginald Johnson RN  Safety Promotion/Fall Prevention:   activity supervised   room near nurse's station   room organization consistent   safety round/check completed   mobility aid in reach   lighting adjusted   increase  visualization of patient  Goal: Optimal Pain Control and Function  Outcome: Progressing  Intervention: Prevent or Manage Pain  Recent Flowsheet Documentation  Taken 6/6/2025 0100 by Reginald Johnson RN  Pain Management Interventions: cold applied  Goal: Nausea and Vomiting Relief  Outcome: Progressing  Goal: Effective Urinary Elimination  Outcome: Progressing  Goal: Effective Oxygenation and Ventilation  Outcome: Progressing

## 2025-06-06 NOTE — PLAN OF CARE
" Goal Outcome Evaluation:      Plan of Care Reviewed With: patient    Overall Patient Progress: improvingOverall Patient Progress: improving    Outcome Evaluation: A&O x4, VSS on RA, pain mngd w/ atarax, oxycodone, tylenol, Ax1 Gb walker, ambulated in [place], dressing CDI, CMS intact, regular diet tolerating well encouraged PO intake, voided 150 mL - x1 unsuccessful straight cath: ongoing RN notified will continue to monitor output, discharge plan home tomorrow w/ schuler pending urine output    Problem: Delirium  Goal: Optimal Coping  Outcome: Progressing  Goal: Improved Behavioral Control  Outcome: Progressing  Intervention: Minimize Safety Risk  Recent Flowsheet Documentation  Taken 6/5/2025 1650 by Jewell Oquendo RN  Enhanced Safety Measures: pain management  Trust Relationship/Rapport: care explained  Goal: Improved Attention and Thought Clarity  Outcome: Progressing  Goal: Improved Sleep  Outcome: Progressing     Problem: Adult Inpatient Plan of Care  Goal: Plan of Care Review  Description: The Plan of Care Review/Shift note should be completed every shift.  The Outcome Evaluation is a brief statement about your assessment that the patient is improving, declining, or no change.  This information will be displayed automatically on your shift  note.  Outcome: Progressing  Flowsheets (Taken 6/5/2025 2303)  Outcome Evaluation: A&O x4, VSS on RA, pain mngd w/ atarax, oxycodone, tylenol, Ax1 Gb walker, ambulated in [place], dressing CDI, CMS intact, regular diet tolerating well encouraged PO intake, voided 150 mL - x1 unsuccessful straight cath: ongoing RN notified will continue to monitor output, discharge plan home tomorrow w/ schuler pending urine output  Plan of Care Reviewed With: patient  Overall Patient Progress: improving  Goal: Patient-Specific Goal (Individualized)  Description: You can add care plan individualizations to a care plan. Examples of Individualization might be:  \"Parent requests to " "be called daily at 9am for status\", \"I have a hard time hearing out of my right ear\", or \"Do not touch me to wake me up as it startles  me\".  Outcome: Progressing  Goal: Absence of Hospital-Acquired Illness or Injury  Outcome: Progressing  Intervention: Identify and Manage Fall Risk  Recent Flowsheet Documentation  Taken 6/5/2025 1650 by Jewell Oquendo RN  Safety Promotion/Fall Prevention: safety round/check completed  Intervention: Prevent and Manage VTE (Venous Thromboembolism) Risk  Recent Flowsheet Documentation  Taken 6/5/2025 1650 by Jewell Oquendo RN  VTE Prevention/Management: SCDs on (sequential compression devices)  Intervention: Prevent Infection  Recent Flowsheet Documentation  Taken 6/5/2025 1650 by Jewell Oquendo RN  Infection Prevention: hand hygiene promoted  Goal: Optimal Comfort and Wellbeing  Outcome: Progressing  Intervention: Monitor Pain and Promote Comfort  Recent Flowsheet Documentation  Taken 6/5/2025 1934 by Jewell Oquendo RN  Pain Management Interventions: medication (see MAR)  Taken 6/5/2025 1852 by Jewell Oquendo RN  Pain Management Interventions: medication (see MAR)  Taken 6/5/2025 1650 by Jewell Oquendo RN  Pain Management Interventions: medication (see MAR)  Intervention: Provide Person-Centered Care  Recent Flowsheet Documentation  Taken 6/5/2025 1650 by Jewell Oquendo RN  Trust Relationship/Rapport: care explained  Goal: Readiness for Transition of Care  Outcome: Progressing     Problem: Hip Arthroplasty  Goal: Optimal Coping  Outcome: Progressing  Goal: Absence of Bleeding  Outcome: Progressing  Intervention: Monitor and Manage Bleeding  Recent Flowsheet Documentation  Taken 6/5/2025 1650 by Jewell Oquendo RN  Bleeding Management: dressing monitored  Goal: Effective Bowel Elimination  Outcome: Progressing  Goal: Fluid and Electrolyte Balance  Outcome: Progressing  Goal: Optimal Functional Ability  Outcome: Progressing  Intervention: Promote " Optimal Functional Status  Recent Flowsheet Documentation  Taken 6/5/2025 2233 by Jewell Oquendo RN  Assistive Device Utilized:   gait belt   walker  Activity Management: ambulated to bathroom  Taken 6/5/2025 1703 by Jewell Oquendo RN  Assistive Device Utilized:   gait belt   walker  Activity Management:   ambulated to bathroom   back to bed  Taken 6/5/2025 1650 by Jewell Oquendo RN  Assistive Device Utilized:   gait belt   walker  Activity Management: activity adjusted per tolerance  Goal: Absence of Infection Signs and Symptoms  Outcome: Progressing  Goal: Intact Neurovascular Status  Outcome: Progressing  Goal: Anesthesia/Sedation Recovery  Outcome: Progressing  Intervention: Optimize Anesthesia Recovery  Recent Flowsheet Documentation  Taken 6/5/2025 1650 by Jewell Oquendo RN  Safety Promotion/Fall Prevention: safety round/check completed  Goal: Optimal Pain Control and Function  Outcome: Progressing  Intervention: Prevent or Manage Pain  Recent Flowsheet Documentation  Taken 6/5/2025 1934 by Jewell Oquendo RN  Pain Management Interventions: medication (see MAR)  Taken 6/5/2025 1852 by Jewell Oquendo RN  Pain Management Interventions: medication (see MAR)  Taken 6/5/2025 1650 by Jewell Oquendo RN  Pain Management Interventions: medication (see MAR)  Goal: Nausea and Vomiting Relief  Outcome: Progressing  Goal: Effective Urinary Elimination  Outcome: Progressing  Goal: Effective Oxygenation and Ventilation  Outcome: Progressing  Intervention: Optimize Oxygenation and Ventilation  Recent Flowsheet Documentation  Taken 6/5/2025 2233 by Jewell Oquendo RN  Activity Management: ambulated to bathroom  Taken 6/5/2025 1703 by Jewell Oquendo RN  Activity Management:   ambulated to bathroom   back to bed  Taken 6/5/2025 1650 by Jewell Oquendo RN  Cough And Deep Breathing: done independently per patient  Activity Management: activity adjusted per tolerance

## 2025-06-07 NOTE — PLAN OF CARE
Occupational Therapy Discharge Summary    Reason for therapy discharge:    Discharged to home.    Progress towards therapy goal(s). See goals on Care Plan in [facility] electronic health record for goal details.  Goals partially met.  Barriers to achieving goals:   discharge from facility.    Therapy recommendation(s):    No further therapy is recommended.

## 2025-07-27 ENCOUNTER — HEALTH MAINTENANCE LETTER (OUTPATIENT)
Age: 80
End: 2025-07-27

## (undated) DEVICE — BONE WAX 2.5GM W31G

## (undated) DEVICE — DRAPE CONVERTORS U-DRAPE 60X72" 8476

## (undated) DEVICE — LINEN FULL SHEET 5511

## (undated) DEVICE — IMM PILLOW ABDUCT HIP MED M60-025-M

## (undated) DEVICE — LINEN HALF SHEET 5512

## (undated) DEVICE — DRAPE IOBAN INCISE 23X17" 6650EZ

## (undated) DEVICE — LINEN ORTHO ACL PACK 5447

## (undated) DEVICE — BLADE SAW SAGITTAL STRK 25X90X1.27MM HD SYS 6 6125-127-090

## (undated) DEVICE — PACK TOTAL HIP RIDGES LATEX PO15HIFSG

## (undated) DEVICE — SU DERMABOND PRINEO 22CM CLR222US

## (undated) DEVICE — GLOVE PROTEXIS POWDER FREE 8.0 ORTHO LIME 2D73ET80

## (undated) DEVICE — SU ETHIBOND 2 V-37 4X30" MX69G

## (undated) DEVICE — SUTURE VICRYL+ 2-0 36IN CT-1 UND VCP945H

## (undated) DEVICE — SU ETHIBOND 0 CT-1 CR 8X18" CX21D

## (undated) DEVICE — GLOVE PROTEXIS BLUE W/NEU-THERA 8.0  2D73EB80

## (undated) DEVICE — DEVICE RETRIEVER HEWSON 71111579

## (undated) DEVICE — LINEN DRAPE 54X72" 5467

## (undated) DEVICE — SET HANDPIECE INTERPULSE W/COAXIAL FAN SPRAY TIP 0210118000

## (undated) DEVICE — Device

## (undated) DEVICE — DRAPE STERI TOWEL LG 1010

## (undated) DEVICE — BAG CLEAR TRASH 1.3M 39X33" P4040C

## (undated) DEVICE — SU STRATAFIX PDS PLUS 0 CT-1 18" SXPP1A401

## (undated) DEVICE — SU MONOCRYL 3-0 PS-2 27" Y427H

## (undated) DEVICE — SOLUTION IV IRRIGATION 0.9% NACL 3L R8206

## (undated) DEVICE — DRAPE TIBURON HIP W/POUCH 29439

## (undated) RX ORDER — FENTANYL CITRATE-0.9 % NACL/PF 10 MCG/ML
PLASTIC BAG, INJECTION (ML) INTRAVENOUS
Status: DISPENSED
Start: 2025-06-04

## (undated) RX ORDER — CEFAZOLIN SODIUM/WATER 2 G/20 ML
SYRINGE (ML) INTRAVENOUS
Status: DISPENSED
Start: 2025-06-04

## (undated) RX ORDER — TRANEXAMIC ACID 650 MG/1
TABLET ORAL
Status: DISPENSED
Start: 2025-06-04

## (undated) RX ORDER — ACETAMINOPHEN 325 MG/1
TABLET ORAL
Status: DISPENSED
Start: 2025-06-04

## (undated) RX ORDER — HYDROMORPHONE HCL IN WATER/PF 6 MG/30 ML
PATIENT CONTROLLED ANALGESIA SYRINGE INTRAVENOUS
Status: DISPENSED
Start: 2025-06-04

## (undated) RX ORDER — ONDANSETRON 2 MG/ML
INJECTION INTRAMUSCULAR; INTRAVENOUS
Status: DISPENSED
Start: 2025-06-04

## (undated) RX ORDER — VANCOMYCIN HYDROCHLORIDE 1 G/20ML
INJECTION, POWDER, LYOPHILIZED, FOR SOLUTION INTRAVENOUS
Status: DISPENSED
Start: 2025-06-04

## (undated) RX ORDER — DEXAMETHASONE SODIUM PHOSPHATE 4 MG/ML
INJECTION, SOLUTION INTRA-ARTICULAR; INTRALESIONAL; INTRAMUSCULAR; INTRAVENOUS; SOFT TISSUE
Status: DISPENSED
Start: 2025-06-04

## (undated) RX ORDER — FENTANYL CITRATE 50 UG/ML
INJECTION, SOLUTION INTRAMUSCULAR; INTRAVENOUS
Status: DISPENSED
Start: 2025-06-04

## (undated) RX ORDER — LIDOCAINE HYDROCHLORIDE 10 MG/ML
INJECTION, SOLUTION EPIDURAL; INFILTRATION; INTRACAUDAL; PERINEURAL
Status: DISPENSED
Start: 2025-06-04

## (undated) RX ORDER — EPHEDRINE SULFATE 50 MG/ML
INJECTION, SOLUTION INTRAMUSCULAR; INTRAVENOUS; SUBCUTANEOUS
Status: DISPENSED
Start: 2025-06-04

## (undated) RX ORDER — GLYCOPYRROLATE 0.2 MG/ML
INJECTION, SOLUTION INTRAMUSCULAR; INTRAVENOUS
Status: DISPENSED
Start: 2025-06-04

## (undated) RX ORDER — PROPOFOL 10 MG/ML
INJECTION, EMULSION INTRAVENOUS
Status: DISPENSED
Start: 2025-06-04

## (undated) RX ORDER — OXYCODONE HYDROCHLORIDE 5 MG/1
TABLET ORAL
Status: DISPENSED
Start: 2025-06-04

## (undated) RX ORDER — HYDROXYZINE HYDROCHLORIDE 10 MG/1
TABLET, FILM COATED ORAL
Status: DISPENSED
Start: 2025-06-04